# Patient Record
Sex: MALE | Race: WHITE | Employment: FULL TIME | URBAN - METROPOLITAN AREA
[De-identification: names, ages, dates, MRNs, and addresses within clinical notes are randomized per-mention and may not be internally consistent; named-entity substitution may affect disease eponyms.]

---

## 2018-12-08 LAB
CHOLESTEROL, TOTAL: 197 MG/DL
CHOLESTEROL/HDL RATIO: 6.4
HDLC SERPL-MCNC: 31 MG/DL (ref 35–70)
LDL CHOLESTEROL CALCULATED: 126 MG/DL (ref 0–160)
TRIGL SERPL-MCNC: 245 MG/DL
VLDLC SERPL CALC-MCNC: ABNORMAL MG/DL

## 2018-12-19 ENCOUNTER — TELEPHONE (OUTPATIENT)
Dept: SURGERY | Age: 43
End: 2018-12-19

## 2019-01-17 ENCOUNTER — OFFICE VISIT (OUTPATIENT)
Dept: SURGERY | Age: 44
End: 2019-01-17
Payer: COMMERCIAL

## 2019-01-17 VITALS
DIASTOLIC BLOOD PRESSURE: 78 MMHG | SYSTOLIC BLOOD PRESSURE: 130 MMHG | HEIGHT: 73 IN | HEART RATE: 89 BPM | WEIGHT: 217 LBS | OXYGEN SATURATION: 95 % | BODY MASS INDEX: 28.76 KG/M2

## 2019-01-17 DIAGNOSIS — K40.90 NON-RECURRENT INGUINAL HERNIA WITHOUT OBSTRUCTION OR GANGRENE, UNSPECIFIED LATERALITY: Primary | ICD-10-CM

## 2019-01-17 PROCEDURE — 99203 OFFICE O/P NEW LOW 30 MIN: CPT | Performed by: SURGERY

## 2019-01-17 RX ORDER — ESCITALOPRAM OXALATE 5 MG/1
TABLET ORAL
Refills: 2 | COMMUNITY
Start: 2018-12-31 | End: 2019-05-24 | Stop reason: SDUPTHER

## 2019-03-25 VITALS
TEMPERATURE: 97.8 F | DIASTOLIC BLOOD PRESSURE: 78 MMHG | HEIGHT: 73 IN | BODY MASS INDEX: 29.42 KG/M2 | SYSTOLIC BLOOD PRESSURE: 128 MMHG | WEIGHT: 222 LBS

## 2019-04-08 ENCOUNTER — TELEPHONE (OUTPATIENT)
Dept: SURGERY | Age: 44
End: 2019-04-08

## 2019-04-08 NOTE — TELEPHONE ENCOUNTER
JEFFERSON Garcia contacted patient regarding scheduling right possible left non reccurrent inguinal hernia repair with Dr Yasmany William. Patient didn't answer so MA left message asking patient to please call office back at [phone].     Electronically signed by Kinza Shaw on 4/8/19 at 9:29 AM

## 2019-04-29 ENCOUNTER — TELEPHONE (OUTPATIENT)
Dept: SURGERY | Age: 44
End: 2019-04-29

## 2019-04-29 NOTE — TELEPHONE ENCOUNTER
JEFFERSON Rosado contacted patient to see if he was ready to schedule hernia repair with Dr Juan Mason. Patient states he has a lot going on right now and will have to call office when he is ready to schedule.     Electronically signed by Neyda De Los Santos on 4/29/19 at 11:35 AM

## 2019-05-24 ENCOUNTER — OFFICE VISIT (OUTPATIENT)
Dept: PRIMARY CARE CLINIC | Age: 44
End: 2019-05-24
Payer: COMMERCIAL

## 2019-05-24 VITALS
BODY MASS INDEX: 28.17 KG/M2 | DIASTOLIC BLOOD PRESSURE: 78 MMHG | WEIGHT: 208 LBS | TEMPERATURE: 98.7 F | SYSTOLIC BLOOD PRESSURE: 126 MMHG | HEIGHT: 72 IN

## 2019-05-24 DIAGNOSIS — K40.90 RIGHT INGUINAL HERNIA: ICD-10-CM

## 2019-05-24 DIAGNOSIS — F41.9 ANXIETY: Primary | ICD-10-CM

## 2019-05-24 DIAGNOSIS — F34.1 DYSTHYMIA: ICD-10-CM

## 2019-05-24 DIAGNOSIS — E78.2 MIXED HYPERLIPIDEMIA: ICD-10-CM

## 2019-05-24 PROCEDURE — 99214 OFFICE O/P EST MOD 30 MIN: CPT | Performed by: FAMILY MEDICINE

## 2019-05-24 RX ORDER — ESCITALOPRAM OXALATE 10 MG/1
10 TABLET ORAL DAILY
Qty: 90 TABLET | Refills: 5 | Status: SHIPPED | OUTPATIENT
Start: 2019-05-24 | End: 2021-01-20

## 2019-05-24 RX ORDER — TRAZODONE HYDROCHLORIDE 50 MG/1
50 TABLET ORAL NIGHTLY PRN
Qty: 30 TABLET | Refills: 1 | Status: SHIPPED | OUTPATIENT
Start: 2019-05-24 | End: 2021-01-20

## 2019-05-24 ASSESSMENT — ENCOUNTER SYMPTOMS
ALLERGIC/IMMUNOLOGIC NEGATIVE: 1
RESPIRATORY NEGATIVE: 1
EYES NEGATIVE: 1
GASTROINTESTINAL NEGATIVE: 1

## 2019-09-09 ENCOUNTER — OFFICE VISIT (OUTPATIENT)
Dept: PRIMARY CARE CLINIC | Age: 44
End: 2019-09-09
Payer: COMMERCIAL

## 2019-09-09 VITALS
DIASTOLIC BLOOD PRESSURE: 68 MMHG | SYSTOLIC BLOOD PRESSURE: 126 MMHG | WEIGHT: 210 LBS | BODY MASS INDEX: 28.44 KG/M2 | TEMPERATURE: 97.9 F | HEIGHT: 72 IN

## 2019-09-09 DIAGNOSIS — F41.9 ANXIETY: ICD-10-CM

## 2019-09-09 DIAGNOSIS — L72.3 SEBACEOUS CYST: Primary | ICD-10-CM

## 2019-09-09 PROCEDURE — 99213 OFFICE O/P EST LOW 20 MIN: CPT | Performed by: FAMILY MEDICINE

## 2019-09-09 RX ORDER — CEPHALEXIN 500 MG/1
500 CAPSULE ORAL 3 TIMES DAILY
Qty: 21 CAPSULE | Refills: 1 | Status: SHIPPED
Start: 2019-09-09 | End: 2021-01-20

## 2019-09-09 ASSESSMENT — ENCOUNTER SYMPTOMS
RESPIRATORY NEGATIVE: 1
ROS SKIN COMMENTS: HPI

## 2019-09-25 ENCOUNTER — TELEPHONE (OUTPATIENT)
Dept: ADMINISTRATIVE | Age: 44
End: 2019-09-25

## 2020-02-06 ENCOUNTER — OFFICE VISIT (OUTPATIENT)
Dept: PRIMARY CARE CLINIC | Age: 45
End: 2020-02-06

## 2020-02-06 VITALS
OXYGEN SATURATION: 97 % | BODY MASS INDEX: 30.34 KG/M2 | RESPIRATION RATE: 16 BRPM | TEMPERATURE: 97.9 F | HEART RATE: 85 BPM | SYSTOLIC BLOOD PRESSURE: 128 MMHG | DIASTOLIC BLOOD PRESSURE: 82 MMHG | HEIGHT: 72 IN | WEIGHT: 224 LBS

## 2020-02-06 PROCEDURE — 99213 OFFICE O/P EST LOW 20 MIN: CPT | Performed by: FAMILY MEDICINE

## 2020-02-06 RX ORDER — PREDNISONE 10 MG/1
TABLET ORAL
Qty: 18 TABLET | Refills: 0 | Status: SHIPPED
Start: 2020-02-06 | End: 2021-01-20

## 2020-02-06 RX ORDER — DEXTROMETHORPHAN HYDROBROMIDE AND PROMETHAZINE HYDROCHLORIDE 15; 6.25 MG/5ML; MG/5ML
5 SYRUP ORAL 4 TIMES DAILY PRN
Qty: 120 ML | Refills: 0 | Status: SHIPPED | OUTPATIENT
Start: 2020-02-06 | End: 2020-02-13

## 2020-02-06 RX ORDER — CEFDINIR 300 MG/1
300 CAPSULE ORAL 2 TIMES DAILY
Qty: 20 CAPSULE | Refills: 0 | Status: SHIPPED | OUTPATIENT
Start: 2020-02-06 | End: 2020-02-16

## 2020-02-06 ASSESSMENT — ENCOUNTER SYMPTOMS
APNEA: 0
COUGH: 1
TROUBLE SWALLOWING: 0
SORE THROAT: 0
SINUS PAIN: 1
SHORTNESS OF BREATH: 0
STRIDOR: 0
SINUS PRESSURE: 1
WHEEZING: 0
CHEST TIGHTNESS: 0

## 2020-02-06 ASSESSMENT — PATIENT HEALTH QUESTIONNAIRE - PHQ9
1. LITTLE INTEREST OR PLEASURE IN DOING THINGS: 0
SUM OF ALL RESPONSES TO PHQ QUESTIONS 1-9: 0
2. FEELING DOWN, DEPRESSED OR HOPELESS: 0
SUM OF ALL RESPONSES TO PHQ9 QUESTIONS 1 & 2: 0
SUM OF ALL RESPONSES TO PHQ QUESTIONS 1-9: 0

## 2020-02-06 NOTE — PROGRESS NOTES
Never Used   Substance and Sexual Activity    Alcohol use: Not Currently    Drug use: Not Currently    Sexual activity: Not on file   Lifestyle    Physical activity:     Days per week: Not on file     Minutes per session: Not on file    Stress: Not on file   Relationships    Social connections:     Talks on phone: Not on file     Gets together: Not on file     Attends Mosque service: Not on file     Active member of club or organization: Not on file     Attends meetings of clubs or organizations: Not on file     Relationship status: Not on file    Intimate partner violence:     Fear of current or ex partner: Not on file     Emotionally abused: Not on file     Physically abused: Not on file     Forced sexual activity: Not on file   Other Topics Concern    Not on file   Social History Narrative        ANXIETY 12-18    , PENDING [address], [address] [place], 2018    HAS A GIRLFRIEND  engaged    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 Vitals:    02/06/20 1308   BP: 128/82   Pulse: 85   Resp: 16   Temp: 97.9 °F (36.6 °C)   SpO2: 97%   Weight: 224 lb (101.6 kg)   Height: 6' (1.829 m)       Objective:    Physical Exam  Constitutional:       Appearance: He is well-developed. HENT:      Head: Normocephalic. Eyes:      Pupils: Pupils are equal, round, and reactive to light. Neck:      Musculoskeletal: Neck supple.    Cardiovascular:      Rate and Rhythm: Normal rate and regular rhythm. Pulmonary:      Effort: Pulmonary effort is normal. No respiratory distress. Breath sounds: No wheezing or rales. Abdominal:      Palpations: Abdomen is soft. Elaine was seen today for chest congestion, nasal congestion and cough. Diagnoses and all orders for this visit:    Acute non-recurrent sinusitis of other sinus  -     cefdinir (OMNICEF) 300 MG capsule; Take 1 capsule by mouth 2 times daily for 10 days  -     predniSONE (DELTASONE) 10 MG tablet; ONE TID FOR THREE DAYS, ONE BID FOR THREE DAYS, ONE QD FOR THREE DAYS   FOOD  -     promethazine-dextromethorphan (PROMETHAZINE-DM) 6.25-15 MG/5ML syrup; Take 5 mLs by mouth 4 times daily as needed for Cough        Comments: med not  Great see  Worse toe r  A great deal of time spent reviewing medications, diet, exercise, social issues. Also reviewing the chart before entering the room with patient and finishing charting after leaving patient's room. More than half of that time was spent face to face with the patient in counseling and coordinating care. Dc smoking    Follow Up: Return if symptoms worsen or fail to improve.      Seen by:  Iesha Whitmore, DO

## 2020-06-08 ENCOUNTER — OFFICE VISIT (OUTPATIENT)
Dept: CHIROPRACTIC MEDICINE | Age: 45
End: 2020-06-08
Payer: COMMERCIAL

## 2020-06-08 ENCOUNTER — OFFICE VISIT (OUTPATIENT)
Dept: FAMILY MEDICINE CLINIC | Age: 45
End: 2020-06-08
Payer: COMMERCIAL

## 2020-06-08 VITALS — HEART RATE: 82 BPM | TEMPERATURE: 97.5 F | OXYGEN SATURATION: 93 % | BODY MASS INDEX: 28.75 KG/M2 | WEIGHT: 212 LBS

## 2020-06-08 VITALS — RESPIRATION RATE: 16 BRPM

## 2020-06-08 PROCEDURE — G8417 CALC BMI ABV UP PARAM F/U: HCPCS | Performed by: CHIROPRACTOR

## 2020-06-08 PROCEDURE — 99203 OFFICE O/P NEW LOW 30 MIN: CPT | Performed by: CHIROPRACTOR

## 2020-06-08 PROCEDURE — 4004F PT TOBACCO SCREEN RCVD TLK: CPT | Performed by: FAMILY MEDICINE

## 2020-06-08 PROCEDURE — 4004F PT TOBACCO SCREEN RCVD TLK: CPT | Performed by: CHIROPRACTOR

## 2020-06-08 PROCEDURE — G8417 CALC BMI ABV UP PARAM F/U: HCPCS | Performed by: FAMILY MEDICINE

## 2020-06-08 PROCEDURE — G8427 DOCREV CUR MEDS BY ELIG CLIN: HCPCS | Performed by: CHIROPRACTOR

## 2020-06-08 PROCEDURE — G8427 DOCREV CUR MEDS BY ELIG CLIN: HCPCS | Performed by: FAMILY MEDICINE

## 2020-06-08 PROCEDURE — 98940 CHIROPRACT MANJ 1-2 REGIONS: CPT | Performed by: CHIROPRACTOR

## 2020-06-08 PROCEDURE — 99213 OFFICE O/P EST LOW 20 MIN: CPT | Performed by: FAMILY MEDICINE

## 2020-06-08 RX ORDER — METHYLPREDNISOLONE 4 MG/1
TABLET ORAL
Qty: 1 KIT | Refills: 0 | Status: SHIPPED
Start: 2020-06-08 | End: 2021-01-20

## 2020-06-08 RX ORDER — IBUPROFEN 800 MG/1
800 TABLET ORAL EVERY 8 HOURS PRN
Qty: 60 TABLET | Refills: 1 | Status: SHIPPED
Start: 2020-06-08 | End: 2021-01-20

## 2020-06-08 RX ORDER — CYCLOBENZAPRINE HCL 5 MG
5 TABLET ORAL 3 TIMES DAILY PRN
Qty: 21 TABLET | Refills: 0 | Status: SHIPPED
Start: 2020-06-08 | End: 2021-01-20

## 2020-06-08 ASSESSMENT — ENCOUNTER SYMPTOMS
BACK PAIN: 1
RESPIRATORY NEGATIVE: 1
BACK PAIN: 1
GASTROINTESTINAL NEGATIVE: 1

## 2020-06-08 NOTE — PROGRESS NOTES
20  Melissa Vasquez : 1975 Sex: male  Age: 53 y.o. Chief Complaint   Patient presents with    Back Pain     lower back after digging a post hole yesterday        Patient 47-year-old white male with a chief complaint of acute onset of lower back pain after undergoing a postal yesterday he has no radiation or radiculopathy associated with it is mostly in the lumbosacral spine particularly on the right side but also on the left. Coughing and sneezing has no effect he does have somewhat of an attitude of flexion secondary to muscle spasm. Review of Systems   Constitutional: Negative. Respiratory: Negative. Cardiovascular: Negative. Gastrointestinal: Negative. Musculoskeletal: Positive for back pain, gait problem and myalgias. Current Outpatient Medications:     cyclobenzaprine (FLEXERIL) 5 MG tablet, Take 1 tablet by mouth 3 times daily as needed for Muscle spasms, Disp: 21 tablet, Rfl: 0    methylPREDNISolone (MEDROL DOSEPACK) 4 MG tablet, Take by mouth., Disp: 1 kit, Rfl: 0    ibuprofen (ADVIL;MOTRIN) 800 MG tablet, Take 1 tablet by mouth every 8 hours as needed for Pain, Disp: 60 tablet, Rfl: 1    predniSONE (DELTASONE) 10 MG tablet, ONE TID FOR THREE DAYS, ONE BID FOR THREE DAYS, ONE QD FOR THREE DAYS   FOOD, Disp: 18 tablet, Rfl: 0    cephALEXin (KEFLEX) 500 MG capsule, Take 1 capsule by mouth 3 times daily, Disp: 21 capsule, Rfl: 1    escitalopram (LEXAPRO) 10 MG tablet, Take 1 tablet by mouth daily, Disp: 90 tablet, Rfl: 5    traZODone (DESYREL) 50 MG tablet, Take 1 tablet by mouth nightly as needed for Sleep, Disp: 30 tablet, Rfl: 1  No Known Allergies    Past Medical History:   Diagnosis Date    Anxiety     Depression     Hypertriglyceridemia     Inguinal hernia     Bilateral    OCD (obsessive compulsive disorder)      No past surgical history on file.   Family History   Problem Relation Age of Onset    High Blood Pressure Father     Diabetes Father

## 2020-06-11 ENCOUNTER — OFFICE VISIT (OUTPATIENT)
Dept: CHIROPRACTIC MEDICINE | Age: 45
End: 2020-06-11
Payer: COMMERCIAL

## 2020-06-11 VITALS — RESPIRATION RATE: 16 BRPM

## 2020-06-11 PROCEDURE — 98940 CHIROPRACT MANJ 1-2 REGIONS: CPT | Performed by: CHIROPRACTOR

## 2021-01-20 ENCOUNTER — TELEPHONE (OUTPATIENT)
Dept: ADMINISTRATIVE | Age: 46
End: 2021-01-20

## 2021-01-20 ENCOUNTER — OFFICE VISIT (OUTPATIENT)
Dept: PRIMARY CARE CLINIC | Age: 46
End: 2021-01-20
Payer: COMMERCIAL

## 2021-01-20 VITALS
HEIGHT: 74 IN | RESPIRATION RATE: 18 BRPM | TEMPERATURE: 97.3 F | HEART RATE: 84 BPM | OXYGEN SATURATION: 97 % | BODY MASS INDEX: 28.23 KG/M2 | WEIGHT: 220 LBS

## 2021-01-20 DIAGNOSIS — Z20.822 ENCOUNTER FOR SCREENING LABORATORY TESTING FOR COVID-19 VIRUS: Primary | ICD-10-CM

## 2021-01-20 DIAGNOSIS — Z20.822 ENCOUNTER FOR SCREENING LABORATORY TESTING FOR COVID-19 VIRUS: ICD-10-CM

## 2021-01-20 DIAGNOSIS — J06.9 UPPER RESPIRATORY TRACT INFECTION, UNSPECIFIED TYPE: ICD-10-CM

## 2021-01-20 LAB
Lab: NORMAL
QC PASS/FAIL: NORMAL
SARS-COV-2, POC: NORMAL

## 2021-01-20 PROCEDURE — 87426 SARSCOV CORONAVIRUS AG IA: CPT | Performed by: FAMILY MEDICINE

## 2021-01-20 PROCEDURE — 99213 OFFICE O/P EST LOW 20 MIN: CPT | Performed by: FAMILY MEDICINE

## 2021-01-20 RX ORDER — AZITHROMYCIN 250 MG/1
250 TABLET, FILM COATED ORAL SEE ADMIN INSTRUCTIONS
Qty: 6 TABLET | Refills: 0 | Status: SHIPPED | OUTPATIENT
Start: 2021-01-20 | End: 2021-01-25

## 2021-01-20 ASSESSMENT — ENCOUNTER SYMPTOMS
ABDOMINAL PAIN: 0
CONSTIPATION: 0
SINUS PRESSURE: 0
RHINORRHEA: 0
VOMITING: 0
EYE PAIN: 0
SHORTNESS OF BREATH: 0
APNEA: 0
BACK PAIN: 0
CHEST TIGHTNESS: 0
BLOOD IN STOOL: 0
EYE REDNESS: 0
WHEEZING: 0
EYE ITCHING: 0
NAUSEA: 0
COLOR CHANGE: 0
DIARRHEA: 0
SWOLLEN GLANDS: 0
SORE THROAT: 0
COUGH: 0

## 2021-01-20 NOTE — PROGRESS NOTES
Chief Complaint:     Chief Complaint   Patient presents with    Generalized Body Aches     sx for last 3 days.  Fatigue    Headache    Chills    Nausea         Generalized Body Aches  This is a new problem. The current episode started in the past 7 days. The problem occurs daily. The problem has been unchanged. Associated symptoms include congestion, fatigue, headaches and myalgias. Pertinent negatives include no abdominal pain, arthralgias, chest pain, chills, coughing, fever, nausea, neck pain, numbness, rash, sore throat, swollen glands, vomiting or weakness. Nothing aggravates the symptoms. He has tried nothing for the symptoms. The treatment provided no relief. Fatigue  This is a new problem. The current episode started in the past 7 days. The problem occurs daily. The problem has been unchanged. Associated symptoms include congestion, fatigue, headaches and myalgias. Pertinent negatives include no abdominal pain, arthralgias, chest pain, chills, coughing, fever, nausea, neck pain, numbness, rash, sore throat, swollen glands, vomiting or weakness. Nothing aggravates the symptoms. He has tried nothing for the symptoms. The treatment provided no relief. Patient Active Problem List   Diagnosis    Anxiety    Dysthymia    Mixed hyperlipidemia    Right inguinal hernia       Past Medical History:   Diagnosis Date    Anxiety     Depression     Hypertriglyceridemia     Inguinal hernia     Bilateral    OCD (obsessive compulsive disorder)        No past surgical history on file. Current Outpatient Medications   Medication Sig Dispense Refill    azithromycin (ZITHROMAX) 250 MG tablet Take 1 tablet by mouth See Admin Instructions for 5 days 500mg on day 1 followed by 250mg on days 2 - 5 6 tablet 0     No current facility-administered medications for this visit.         No Known Allergies    Social History     Socioeconomic History    Marital status: Single     Spouse name: None  Number of children: None    Years of education: None    Highest education level: None   Occupational History    None   Social Needs    Financial resource strain: None    Food insecurity     Worry: None     Inability: None    Transportation needs     Medical: None     Non-medical: None   Tobacco Use    Smoking status: Current Every Day Smoker     Packs/day: 0.50    Smokeless tobacco: Never Used   Substance and Sexual Activity    Alcohol use: Not Currently    Drug use: Not Currently    Sexual activity: None   Lifestyle    Physical activity     Days per week: None     Minutes per session: None    Stress: None   Relationships    Social connections     Talks on phone: None     Gets together: None     Attends Caodaism service: None     Active member of club or organization: None     Attends meetings of clubs or organizations: None     Relationship status: None    Intimate partner violence     Fear of current or ex partner: None     Emotionally abused: None     Physically abused: None     Forced sexual activity: None   Other Topics Concern    None   Social History Narrative        ANXIETY 12-18    , PENDING DIVORCE    THREE CHILDREN, 11, 13 AND 14    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 Negative for activity change, appetite change, chills and fever. HENT: Positive for congestion. Negative for ear pain, hearing loss, nosebleeds, rhinorrhea, sinus pressure and sore throat. Eyes: Negative for pain, redness, itching and visual disturbance. Respiratory: Negative for apnea, cough, chest tightness, shortness of breath and wheezing. Cardiovascular: Negative for chest pain, palpitations and leg swelling. Gastrointestinal: Negative for abdominal pain, blood in stool, constipation, diarrhea, nausea and vomiting. Endocrine: Negative. Genitourinary: Negative for decreased urine volume, difficulty urinating, dysuria, frequency, hematuria and urgency. Musculoskeletal: Positive for myalgias. Negative for arthralgias, back pain, gait problem and neck pain. Skin: Negative for color change and rash. Allergic/Immunologic: Negative for environmental allergies and food allergies. Neurological: Positive for headaches. Negative for dizziness, weakness, light-headedness and numbness. Hematological: Negative for adenopathy. Does not bruise/bleed easily. Psychiatric/Behavioral: Negative for behavioral problems, dysphoric mood and sleep disturbance. The patient is not nervous/anxious and is not hyperactive. All other systems reviewed and are negative. Pulse 84   Temp 97.3 °F (36.3 °C)   Resp 18   Ht 6' 2\" (1.88 m)   Wt 220 lb (99.8 kg)   SpO2 97%   BMI 28.25 kg/m²     Physical Exam  Vitals signs and nursing note reviewed. Constitutional:       Appearance: He is well-developed. He is not toxic-appearing. HENT:      Head: Normocephalic and atraumatic. Right Ear: Ear canal normal. A middle ear effusion is present. Tympanic membrane is bulging. Left Ear: Ear canal normal. A middle ear effusion is present. Tympanic membrane is bulging. Nose: Mucosal edema and congestion present. No nasal deformity, septal deviation or rhinorrhea. Right Turbinates: Enlarged and swollen. Left Turbinates: Enlarged and swollen. Right Sinus: Maxillary sinus tenderness present. Left Sinus: Maxillary sinus tenderness present. Mouth/Throat:      Mouth: Mucous membranes are not pale and not dry. -     azithromycin (ZITHROMAX) 250 MG tablet; Take 1 tablet by mouth See Admin Instructions for 5 days 500mg on day 1 followed by 250mg on days 2 - 5          Return if symptoms worsen or fail to improve. I spent 15 minutes with this patient. I spent greater than 50% of the time counseling this patient.         John Paul Wu, DO  1/20/2021  11:23 AM

## 2021-01-20 NOTE — TELEPHONE ENCOUNTER
Anyone takes his insurance probably, I [facility].   He probably does not need a referral but if he finds out he does to let us know

## 2021-01-20 NOTE — TELEPHONE ENCOUNTER
Pt called and was wondering who pcp would advise him to go to for counseling. He recently went through a divorce and his kids are coming to live with him. Please contact pt. Also, pt said he is having covid symptoms and is going to [place] [facility].

## 2021-01-22 LAB
SARS-COV-2: NOT DETECTED
SOURCE: NORMAL

## 2021-02-17 ENCOUNTER — OFFICE VISIT (OUTPATIENT)
Dept: FAMILY MEDICINE CLINIC | Age: 46
End: 2021-02-17
Payer: COMMERCIAL

## 2021-02-17 VITALS
SYSTOLIC BLOOD PRESSURE: 112 MMHG | HEART RATE: 74 BPM | OXYGEN SATURATION: 99 % | HEIGHT: 74 IN | BODY MASS INDEX: 27.59 KG/M2 | WEIGHT: 215 LBS | TEMPERATURE: 97.4 F | DIASTOLIC BLOOD PRESSURE: 68 MMHG

## 2021-02-17 DIAGNOSIS — Z20.822 EXPOSURE TO COVID-19 VIRUS: Primary | ICD-10-CM

## 2021-02-17 LAB
Lab: NORMAL
QC PASS/FAIL: NORMAL
SARS-COV-2, POC: NORMAL

## 2021-02-17 PROCEDURE — 87426 SARSCOV CORONAVIRUS AG IA: CPT | Performed by: FAMILY MEDICINE

## 2021-02-17 PROCEDURE — 99213 OFFICE O/P EST LOW 20 MIN: CPT | Performed by: FAMILY MEDICINE

## 2021-02-17 NOTE — PROGRESS NOTES
Chief Complaint   No chief complaint on file. History of Present Illness   Source of history provided by: patient. Nasim Bocanegra is a 50 y.o. old male who has a past medical history of:   Past Medical History:   Diagnosis Date    Anxiety     Depression     Hypertriglyceridemia     Inguinal hernia     Bilateral    OCD (obsessive compulsive disorder)     Presents to the flu clinic for evaluation of Covid after recent exposure. Has been asymptomatic. No history of international travel in the past 14 days. Positive contact with individuals with known COVID-19 infection or under investigation for COVID-19 infection. Patient brother-in-law tested positive and they were exposed roughly 3 to 4 days ago. Denies any hx of asthma or COPD. Positive hx of tobacco use. ROS   Pertinent positives and negatives are stated within HPI, all other systems reviewed and are negative. Surgical History:  has no past surgical history on file. Social History:  reports that he has been smoking. He has been smoking about 0.50 packs per day. He has never used smokeless tobacco. He reports previous alcohol use. He reports previous drug use. Family History: family history includes Diabetes in his father; High Blood Pressure in his father. Allergies: Patient has no known allergies. Physical Exam      VS:  /68   Pulse 74   Temp 97.4 °F (36.3 °C) (Temporal)   Ht 6' 2\" (1.88 m)   Wt 215 lb (97.5 kg)   SpO2 99%   BMI 27.60 kg/m²    Oxygen Saturation Interpretation: Normal.    Constitutional:  Alert, development consistent with age. NAD. Head:  NC/NT. Airway patent. Nose: turbinates with mild erythema, no lesions. Mouth: Posterior pharynx with mild erythema and clear postnasal drip. No tonsillar hypertrophy or exudate. Neck:  Normal ROM. Supple. No anterior cervical adenopathy noted. Lungs: CTAB without wheezes, rales, or rhonchi. CV:  Regular rate and rhythm, normal heart sounds, without pathological murmurs, ectopy, gallops, or rubs. Abdomen: soft, nontender, NABS x 4, no firm or pulsatile masses, no organomegaly, no rebound or guarding. Skin:  Normal turgor. Warm, dry, without visible rash. Lymphatic: No lymphangitis or adenopathy noted unless otherwise specified. Neurological:  Oriented. Motor functions intact. Lab / Imaging Results   (All laboratory and radiology results have been personally reviewed by myself)  Labs:  Results for orders placed or performed in visit on 02/17/21   POCT COVID-19, Antigen   Result Value Ref Range    SARS-COV-2, POC Not-Detected Not Detected    Lot Number [id number]     QC Pass/Fail pass        Imaging: All Radiology results interpreted by Radiologist unless otherwise noted. No results found. Medical Decision Making   Pt non-toxic, in no apparent distress and stable at time of discharge.      Assessment/Plan   Diagnoses and all orders for this visit:    Exposure to COVID-19 virus  -     COVID-19 Ambulatory  -     POCT COVID-19, Antigen COVID-19 swab obtained and pending, will call with results once available. Advised cautionary self-quarantine at home in the interim. Pt should remain out of work for at least 10-14 days from the start of symptoms. Pt should also be fever free for 24 hours and symptoms should be improved overall prior to returning to work. Work excuse provided to patient today. Scripts written for no medication, side effects discussed. Increase fluids and rest. Symptomatic relief discussed including Tylenol prn pain/fever. Schedule virtual f/u with PCP in 7-10 days if symptoms persist. ED sooner if symptoms worsen or change. ED immediately with high or refractory fever, progressive SOB, dyspnea, CP, calf pain/swelling, shaking chills, vomiting, abdominal pain, lethargy, flank pain, or decreased urinary output. Pt verbalizes understanding and is in agreement with plan of care. All questions answered. Mateo Ya, DO    This visit was provided as a focused evaluation during the COVID -19 pandemic/national emergency. A comprehensive review of all previous patient history and testing was not conducted. Pertinent findings were elicited during the visit.

## 2021-02-18 ENCOUNTER — VIRTUAL VISIT (OUTPATIENT)
Dept: PRIMARY CARE CLINIC | Age: 46
End: 2021-02-18
Payer: COMMERCIAL

## 2021-02-18 DIAGNOSIS — F41.9 ANXIETY: Primary | ICD-10-CM

## 2021-02-18 PROCEDURE — 99213 OFFICE O/P EST LOW 20 MIN: CPT | Performed by: FAMILY MEDICINE

## 2021-02-18 RX ORDER — ESCITALOPRAM OXALATE 10 MG/1
10 TABLET ORAL DAILY
Qty: 30 TABLET | Refills: 1 | Status: SHIPPED
Start: 2021-02-18 | End: 2021-02-27 | Stop reason: SDUPTHER

## 2021-02-18 ASSESSMENT — PATIENT HEALTH QUESTIONNAIRE - PHQ9
SUM OF ALL RESPONSES TO PHQ9 QUESTIONS 1 & 2: 0
1. LITTLE INTEREST OR PLEASURE IN DOING THINGS: 0
SUM OF ALL RESPONSES TO PHQ QUESTIONS 1-9: 0
SUM OF ALL RESPONSES TO PHQ QUESTIONS 1-9: 0

## 2021-02-18 NOTE — PROGRESS NOTES
Kady Fung is a 50 y.o. male evaluated via telephone on 2/18/2021. Consent:  He and/or health care decision maker is aware that that he may receive a bill for this telephone service, depending on his insurance coverage, and has provided verbal consent to proceed: Yes      Documentation:  I communicated with the patient and/or health care decision maker about cougg. Details of this discussion including any medical advice provided:    disucs covid  Bro in law coivd  And    Yet he  Ck  And  Rapid neg and sent out and penidng  Ask for refill meds  He say out  of lexapro for three months     nees refill    Wait to see what his Covid test comes out and let us know. Given 1 month supply of Lexapro see me in a month for physical            I affirm this is a Patient Initiated Episode with a Patient who has not had a related appointment within my department in the past 7 days or scheduled within the next 24 hours.     Patient identification was verified at the start of the visit: Yes    Total Time: minutes: 5-10 minutes    Note: not billable if this call serves to triage the patient into an appointment for the relevant concern      Arley Garcia

## 2021-02-19 LAB
SARS-COV-2: NOT DETECTED
SOURCE: NORMAL

## 2021-02-27 ENCOUNTER — OFFICE VISIT (OUTPATIENT)
Dept: PRIMARY CARE CLINIC | Age: 46
End: 2021-02-27
Payer: COMMERCIAL

## 2021-02-27 DIAGNOSIS — F41.9 ANXIETY: ICD-10-CM

## 2021-02-27 DIAGNOSIS — H61.22 IMPACTED CERUMEN, LEFT EAR: ICD-10-CM

## 2021-02-27 DIAGNOSIS — E78.2 MIXED HYPERLIPIDEMIA: ICD-10-CM

## 2021-02-27 DIAGNOSIS — D22.9 ATYPICAL MOLE: Primary | ICD-10-CM

## 2021-02-27 DIAGNOSIS — Z00.01 ENCOUNTER FOR ANNUAL GENERAL MEDICAL EXAMINATION WITH ABNORMAL FINDINGS IN ADULT: ICD-10-CM

## 2021-02-27 DIAGNOSIS — Z12.5 PROSTATE CANCER SCREENING: ICD-10-CM

## 2021-02-27 PROCEDURE — 99396 PREV VISIT EST AGE 40-64: CPT | Performed by: FAMILY MEDICINE

## 2021-02-27 RX ORDER — ESCITALOPRAM OXALATE 10 MG/1
10 TABLET ORAL DAILY
Qty: 90 TABLET | Refills: 5 | Status: SHIPPED | OUTPATIENT
Start: 2021-02-27

## 2021-02-27 ASSESSMENT — ENCOUNTER SYMPTOMS
EYES NEGATIVE: 1
RESPIRATORY NEGATIVE: 1
ALLERGIC/IMMUNOLOGIC NEGATIVE: 1
GASTROINTESTINAL NEGATIVE: 1

## 2021-02-27 NOTE — PROGRESS NOTES
21  Name: Jan Santiago    : 1975    Sex: male    Age: 50 y.o. Subjective:  Chief Complaint: Patient is here for  Ck up and  Ask for me  to [address]     Wants to quit smoking    Stress  With wife    Ex  Wife issues  Kids want to move with him  Saw consleor past but  She is out office  sin enov  Wt up 11 lbs in 18 months  Work  [employer]      Review of Systems   Constitutional: Negative. HENT: Negative. Eyes: Negative. Respiratory: Negative. Cardiovascular: Negative. Gastrointestinal: Negative. Endocrine: Negative. Genitourinary: Negative. Musculoskeletal: Negative. Skin: Negative. Allergic/Immunologic: Negative. Neurological: Negative. Hematological: Negative. Psychiatric/Behavioral: The patient is nervous/anxious.           Current Outpatient Medications:     escitalopram (LEXAPRO) 10 MG tablet, Take 1 tablet by mouth daily, Disp: 90 tablet, Rfl: 5  No Known Allergies  Social History     Socioeconomic History    Marital status: Single     Spouse name: Not on file    Number of children: Not on file    Years of education: Not on file    Highest education level: Not on file   Occupational History    Not on file   Social Needs    Financial resource strain: Not on file    Food insecurity     Worry: Not on file     Inability: Not on file    Transportation needs     Medical: Not on file     Non-medical: Not on file   Tobacco Use    Smoking status: Current Every Day Smoker     Packs/day: 0.50    Smokeless tobacco: Never Used   Substance and Sexual Activity    Alcohol use: Not Currently    Drug use: Not Currently    Sexual activity: Not on file   Lifestyle    Physical activity     Days per week: Not on file     Minutes per session: Not on file    Stress: Not on file   Relationships    Social connections     Talks on phone: Not on file     Gets together: Not on file     Attends [employer] service: Not on file Active member of club or organization: Not on file     Attends meetings of clubs or organizations: Not on file     Relationship status: Not on file    Intimate partner violence     Fear of current or ex partner: Not on file     Emotionally abused: Not on file     Physically abused: Not on file     Forced sexual activity: Not on file   Other Topics Concern    Not on file   Social History Narrative        ANXIETY 12-18    , PENDING DIVORCEdone  11-20    [address], 4, 11 AND 12    [place] [facility] [place] [facility], [place]    [address] [facility] A GIRLFRIEND  engaged---5 yrs older--had one sone but murdered    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 Vitals:    02/27/21 0903   BP: 128/78   Resp: 16   Temp: 98 °F (36.7 °C)   TempSrc: Tympanic   Weight: 221 lb (100.2 kg)   Height: 6' 2\" (1.88 m)       Objective:    Physical Exam  Vitals signs reviewed. Constitutional:       Appearance: He is well-developed. HENT:      Head: Normocephalic. Ears:      Comments: Left earwax hard  Eyes:      Pupils: Pupils are equal, round, and reactive to light. Neck:      Musculoskeletal: Normal range of motion. Cardiovascular:      Rate and Rhythm: Normal rate and regular rhythm. Pulmonary:      Effort: Pulmonary effort is normal.      Breath sounds: Normal breath sounds. Abdominal:      Palpations: Abdomen is soft. Musculoskeletal: Normal range of motion.

## 2021-03-01 VITALS
HEIGHT: 74 IN | RESPIRATION RATE: 16 BRPM | WEIGHT: 221 LBS | DIASTOLIC BLOOD PRESSURE: 78 MMHG | BODY MASS INDEX: 28.36 KG/M2 | TEMPERATURE: 98 F | SYSTOLIC BLOOD PRESSURE: 128 MMHG

## 2021-03-01 PROBLEM — H61.22 IMPACTED CERUMEN, LEFT EAR: Status: ACTIVE | Noted: 2021-03-01

## 2021-03-02 DIAGNOSIS — Z00.01 ENCOUNTER FOR ANNUAL GENERAL MEDICAL EXAMINATION WITH ABNORMAL FINDINGS IN ADULT: ICD-10-CM

## 2021-03-02 DIAGNOSIS — Z12.5 PROSTATE CANCER SCREENING: ICD-10-CM

## 2021-03-02 LAB
ALBUMIN SERPL-MCNC: 4.3 G/DL (ref 3.5–5.2)
ALP BLD-CCNC: 68 U/L (ref 40–129)
ALT SERPL-CCNC: 20 U/L (ref 0–40)
ANION GAP SERPL CALCULATED.3IONS-SCNC: 13 MMOL/L (ref 7–16)
AST SERPL-CCNC: 19 U/L (ref 0–39)
BASOPHILS ABSOLUTE: 0.1 E9/L (ref 0–0.2)
BASOPHILS RELATIVE PERCENT: 1.1 % (ref 0–2)
BILIRUB SERPL-MCNC: 0.3 MG/DL (ref 0–1.2)
BILIRUBIN URINE: NEGATIVE
BLOOD, URINE: NEGATIVE
BUN BLDV-MCNC: 13 MG/DL (ref 6–20)
CALCIUM SERPL-MCNC: 9.4 MG/DL (ref 8.6–10.2)
CHLORIDE BLD-SCNC: 108 MMOL/L (ref 98–107)
CHOLESTEROL, TOTAL: 165 MG/DL (ref 0–199)
CLARITY: CLEAR
CO2: 22 MMOL/L (ref 22–29)
COLOR: YELLOW
CREAT SERPL-MCNC: 1.2 MG/DL (ref 0.7–1.2)
EOSINOPHILS ABSOLUTE: 0.78 E9/L (ref 0.05–0.5)
EOSINOPHILS RELATIVE PERCENT: 8.8 % (ref 0–6)
GFR AFRICAN AMERICAN: >60
GFR NON-AFRICAN AMERICAN: >60 ML/MIN/1.73
GLUCOSE BLD-MCNC: 98 MG/DL (ref 74–99)
GLUCOSE URINE: NEGATIVE MG/DL
HCT VFR BLD CALC: 45.6 % (ref 37–54)
HDLC SERPL-MCNC: 46 MG/DL
HEMOGLOBIN: 15.5 G/DL (ref 12.5–16.5)
IMMATURE GRANULOCYTES #: 0.02 E9/L
IMMATURE GRANULOCYTES %: 0.2 % (ref 0–5)
KETONES, URINE: NEGATIVE MG/DL
LDL CHOLESTEROL CALCULATED: 86 MG/DL (ref 0–99)
LEUKOCYTE ESTERASE, URINE: NEGATIVE
LYMPHOCYTES ABSOLUTE: 3.34 E9/L (ref 1.5–4)
LYMPHOCYTES RELATIVE PERCENT: 37.6 % (ref 20–42)
MCH RBC QN AUTO: 32.3 PG (ref 26–35)
MCHC RBC AUTO-ENTMCNC: 34 % (ref 32–34.5)
MCV RBC AUTO: 95 FL (ref 80–99.9)
MONOCYTES ABSOLUTE: 0.52 E9/L (ref 0.1–0.95)
MONOCYTES RELATIVE PERCENT: 5.8 % (ref 2–12)
NEUTROPHILS ABSOLUTE: 4.13 E9/L (ref 1.8–7.3)
NEUTROPHILS RELATIVE PERCENT: 46.5 % (ref 43–80)
NITRITE, URINE: NEGATIVE
PDW BLD-RTO: 12.7 FL (ref 11.5–15)
PH UA: 6 (ref 5–9)
PLATELET # BLD: 282 E9/L (ref 130–450)
PMV BLD AUTO: 10 FL (ref 7–12)
POTASSIUM SERPL-SCNC: 4.5 MMOL/L (ref 3.5–5)
PROSTATE SPECIFIC ANTIGEN: 0.88 NG/ML (ref 0–4)
PROTEIN UA: NEGATIVE MG/DL
RBC # BLD: 4.8 E12/L (ref 3.8–5.8)
SODIUM BLD-SCNC: 143 MMOL/L (ref 132–146)
SPECIFIC GRAVITY UA: 1.02 (ref 1–1.03)
TOTAL PROTEIN: 6.7 G/DL (ref 6.4–8.3)
TRIGL SERPL-MCNC: 166 MG/DL (ref 0–149)
UROBILINOGEN, URINE: 0.2 E.U./DL
VLDLC SERPL CALC-MCNC: 33 MG/DL
WBC # BLD: 8.9 E9/L (ref 4.5–11.5)

## 2021-03-06 ENCOUNTER — OFFICE VISIT (OUTPATIENT)
Dept: PRIMARY CARE CLINIC | Age: 46
End: 2021-03-06
Payer: COMMERCIAL

## 2021-03-06 VITALS
TEMPERATURE: 98.8 F | HEIGHT: 74 IN | WEIGHT: 222 LBS | DIASTOLIC BLOOD PRESSURE: 68 MMHG | SYSTOLIC BLOOD PRESSURE: 126 MMHG | BODY MASS INDEX: 28.49 KG/M2

## 2021-03-06 DIAGNOSIS — H61.22 IMPACTED CERUMEN, LEFT EAR: ICD-10-CM

## 2021-03-06 DIAGNOSIS — F41.9 ANXIETY: ICD-10-CM

## 2021-03-06 DIAGNOSIS — F34.1 DYSTHYMIA: ICD-10-CM

## 2021-03-06 DIAGNOSIS — Z00.01 ENCOUNTER FOR ANNUAL GENERAL MEDICAL EXAMINATION WITH ABNORMAL FINDINGS IN ADULT: Primary | ICD-10-CM

## 2021-03-06 PROCEDURE — 99214 OFFICE O/P EST MOD 30 MIN: CPT | Performed by: FAMILY MEDICINE

## 2021-03-06 ASSESSMENT — ENCOUNTER SYMPTOMS
EYES NEGATIVE: 1
ALLERGIC/IMMUNOLOGIC NEGATIVE: 1
RESPIRATORY NEGATIVE: 1
GASTROINTESTINAL NEGATIVE: 1

## 2021-03-27 ENCOUNTER — IMMUNIZATION (OUTPATIENT)
Dept: PRIMARY CARE CLINIC | Age: 46
End: 2021-03-27
Payer: COMMERCIAL

## 2021-03-27 PROCEDURE — 0001A COVID-19, PFIZER VACCINE 30MCG/0.3ML DOSE: CPT | Performed by: INTERNAL MEDICINE

## 2021-03-27 PROCEDURE — 91300 COVID-19, PFIZER VACCINE 30MCG/0.3ML DOSE: CPT | Performed by: INTERNAL MEDICINE

## 2021-04-27 ENCOUNTER — IMMUNIZATION (OUTPATIENT)
Dept: PRIMARY CARE CLINIC | Age: 46
End: 2021-04-27
Payer: COMMERCIAL

## 2021-04-27 PROCEDURE — 91300 COVID-19, PFIZER VACCINE 30MCG/0.3ML DOSE: CPT | Performed by: NURSE PRACTITIONER

## 2021-04-27 PROCEDURE — 0002A COVID-19, PFIZER VACCINE 30MCG/0.3ML DOSE: CPT | Performed by: NURSE PRACTITIONER

## 2022-01-03 ENCOUNTER — OFFICE VISIT (OUTPATIENT)
Dept: FAMILY MEDICINE CLINIC | Age: 47
End: 2022-01-03
Payer: COMMERCIAL

## 2022-01-03 VITALS
SYSTOLIC BLOOD PRESSURE: 128 MMHG | BODY MASS INDEX: 29.02 KG/M2 | WEIGHT: 226 LBS | DIASTOLIC BLOOD PRESSURE: 78 MMHG | HEART RATE: 100 BPM | TEMPERATURE: 97.9 F | OXYGEN SATURATION: 92 %

## 2022-01-03 DIAGNOSIS — Z20.822 EXPOSURE TO COVID-19 VIRUS: ICD-10-CM

## 2022-01-03 DIAGNOSIS — J01.80 ACUTE NON-RECURRENT SINUSITIS OF OTHER SINUS: ICD-10-CM

## 2022-01-03 DIAGNOSIS — Z20.822 EXPOSURE TO COVID-19 VIRUS: Primary | ICD-10-CM

## 2022-01-03 LAB
Lab: NORMAL
PERFORMING INSTRUMENT: NORMAL
QC PASS/FAIL: NORMAL
SARS-COV-2, POC: NORMAL

## 2022-01-03 PROCEDURE — 87426 SARSCOV CORONAVIRUS AG IA: CPT | Performed by: FAMILY MEDICINE

## 2022-01-03 PROCEDURE — 99213 OFFICE O/P EST LOW 20 MIN: CPT | Performed by: FAMILY MEDICINE

## 2022-01-03 RX ORDER — METHYLPREDNISOLONE 4 MG/1
TABLET ORAL
Qty: 1 KIT | Refills: 0 | Status: SHIPPED | OUTPATIENT
Start: 2022-01-03

## 2022-01-03 RX ORDER — AZITHROMYCIN 250 MG/1
250 TABLET, FILM COATED ORAL SEE ADMIN INSTRUCTIONS
Qty: 6 TABLET | Refills: 0 | Status: SHIPPED | OUTPATIENT
Start: 2022-01-03 | End: 2022-01-08

## 2022-01-03 ASSESSMENT — ENCOUNTER SYMPTOMS
ALLERGIC/IMMUNOLOGIC NEGATIVE: 1
GASTROINTESTINAL NEGATIVE: 1
EYES NEGATIVE: 1
RESPIRATORY NEGATIVE: 1

## 2022-01-03 ASSESSMENT — PATIENT HEALTH QUESTIONNAIRE - PHQ9
1. LITTLE INTEREST OR PLEASURE IN DOING THINGS: 0
SUM OF ALL RESPONSES TO PHQ QUESTIONS 1-9: 0
2. FEELING DOWN, DEPRESSED OR HOPELESS: 0
SUM OF ALL RESPONSES TO PHQ QUESTIONS 1-9: 0
SUM OF ALL RESPONSES TO PHQ9 QUESTIONS 1 & 2: 0
SUM OF ALL RESPONSES TO PHQ QUESTIONS 1-9: 0
SUM OF ALL RESPONSES TO PHQ QUESTIONS 1-9: 0

## 2022-01-03 NOTE — PROGRESS NOTES
1/3/22  Name: Sterling Wang    : 1975    Sex: male    Age: 48 y.o. Subjective:  Chief Complaint: Patient is here for cough  Nate  Sinus     chills     Here with aris  Also ill no tmep    But slgiht chills      Review of Systems   Constitutional: Positive for chills. Negative for diaphoresis, fatigue and fever. HENT: Negative. Eyes: Negative. Respiratory: Negative. Cardiovascular: Negative. Gastrointestinal: Negative. Endocrine: Negative. Genitourinary: Negative. Musculoskeletal: Negative. Skin: Negative. Allergic/Immunologic: Negative. Neurological: Negative. Hematological: Negative. Psychiatric/Behavioral: Negative.           Current Outpatient Medications:     azithromycin (ZITHROMAX) 250 MG tablet, Take 1 tablet by mouth See Admin Instructions for 5 days 500mg on day 1 followed by 250mg on days 2 - 5, Disp: 6 tablet, Rfl: 0    methylPREDNISolone (MEDROL DOSEPACK) 4 MG tablet, Take by mouth., Disp: 1 kit, Rfl: 0    escitalopram (LEXAPRO) 10 MG tablet, Take 1 tablet by mouth daily, Disp: 90 tablet, Rfl: 5  No Known Allergies  Social History     Socioeconomic History    Marital status: Single     Spouse name: Not on file    Number of children: Not on file    Years of education: Not on file    Highest education level: Not on file   Occupational History    Not on file   Tobacco Use    Smoking status: Current Every Day Smoker     Packs/day: 0.50    Smokeless tobacco: Never Used   Substance and Sexual Activity    Alcohol use: Not Currently    Drug use: Not Currently    Sexual activity: Not on file   Other Topics Concern    Not on file   Social History Narrative        ANXIETY 12-18    , PENDING DIVORCEdone      THREE CHILDREN, 11, 13 AND 14    MOVED FROM [facility] [place] [facility],     HAS A GIRLFRIEND  engaged---5 yrs older--had one sone but murdered    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       FAMILY HX OF DIABETES, HYPERTENSION IN FATHER    BILATERAL INGUINAL HERNIAS SINCE AGE 35 WITHOUT SURGERY    CIGARETTE SMOKER    HYPERTRIGLYCERIDEMIA 12-18    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   1-21     Social Determinants of Health     Financial Resource Strain:     Difficulty of Paying Living Expenses: Not on file   Food Insecurity:     Worried About Running Out of Food in the Last Year: Not on file    Kit of Food in the Last Year: Not on file   Transportation Needs:     Lack of Transportation (Medical): Not on file    Lack of Transportation (Non-Medical): Not on file   Physical Activity:     Days of Exercise per Week: Not on file    Minutes of Exercise per Session: Not on file   Stress:     Feeling of Stress : Not on file   Social Connections:     Frequency of Communication with Friends and Family: Not on file    Frequency of Social Gatherings with Friends and Family: Not on file    Attends Druze Services: Not on file    Active Member of [address] [place] [address] or Organizations: Not on file    Attends Club or Organization Meetings: Not on file    Marital Status: Not on file   Intimate Partner Violence:     Fear of Current or Ex-Partner: Not on file    Emotionally Abused: Not on file    Physically Abused: Not on file    Sexually Abused: Not on file   Housing Stability:     Unable to Pay for Housing in the Last Year: Not on file    Number of [place] in the Last Year: Not on file    Unstable Housing in the Last Year: Not on file      Past Medical History:   Diagnosis Date    Anxiety     Depression     Hypertriglyceridemia     Inguinal hernia     Bilateral    OCD (obsessive compulsive disorder)      Family History   Problem Relation Age of Onset    High Blood Pressure Father     Diabetes Father       No past surgical history on file.    Vitals:    01/03/22 1045   BP: 128/78   Pulse: 100   Temp: 97.9 °F (36.6 °C)   TempSrc: Infrared   SpO2: 92%   Weight: 226 lb (102.5 kg) Objective:    Physical Exam  Vitals reviewed. Constitutional:       Appearance: He is well-developed. HENT:      Head: Normocephalic. Eyes:      Pupils: Pupils are equal, round, and reactive to light. Cardiovascular:      Rate and Rhythm: Normal rate and regular rhythm. Pulmonary:      Effort: Pulmonary effort is normal.      Breath sounds: Normal breath sounds. Abdominal:      Palpations: Abdomen is soft. Musculoskeletal:         General: Normal range of motion. Cervical back: Normal range of motion. Skin:     General: Skin is warm. Neurological:      Mental Status: He is alert and oriented to person, place, and time. Psychiatric:         Behavior: Behavior normal.         Elaine was seen today for headache and congestion. Diagnoses and all orders for this visit:    Exposure to COVID-19 virus  -     POCT COVID-19, Antigen    Acute non-recurrent sinusitis of other sinus  -     azithromycin (ZITHROMAX) 250 MG tablet; Take 1 tablet by mouth See Admin Instructions for 5 days 500mg on day 1 followed by 250mg on days 2 - 5  -     methylPREDNISolone (MEDROL DOSEPACK) 4 MG tablet; Take by mouth. Comments: rapid covid      meds  Not  grea madalyn     Z-Robin prescribed. Over-the-counter zinc and vitamin C. Purchase an oximeter and call if oxygen saturation less than 90%. If any temperature over 102 degree, shortness of breath,  chest pain go to the emergency room. Complete isolation until results are back from the Covid testing. Do not work until results are back. A great deal of time spent reviewing medications, diet, exercise, social issues. Also reviewing the chart before entering the room with patient and finishing charting after leaving patient's room. More than half of that time was spent face to face with the patient in counseling and coordinating care. Follow Up: Return if symptoms worsen or fail to improve, for Meds. If worse go to ER. Not better in 24 hr see. Seen by:  Mary Hung, DO

## 2022-01-05 LAB
SARS-COV-2: NOT DETECTED
SOURCE: NORMAL

## 2022-01-06 ENCOUNTER — TELEPHONE (OUTPATIENT)
Dept: PRIMARY CARE CLINIC | Age: 47
End: 2022-01-06

## 2022-11-28 ENCOUNTER — OFFICE VISIT (OUTPATIENT)
Dept: FAMILY MEDICINE CLINIC | Age: 47
End: 2022-11-28
Payer: COMMERCIAL

## 2022-11-28 VITALS
OXYGEN SATURATION: 98 % | BODY MASS INDEX: 29.52 KG/M2 | TEMPERATURE: 97.5 F | DIASTOLIC BLOOD PRESSURE: 76 MMHG | HEIGHT: 74 IN | HEART RATE: 91 BPM | WEIGHT: 230 LBS | SYSTOLIC BLOOD PRESSURE: 128 MMHG

## 2022-11-28 DIAGNOSIS — J01.90 ACUTE SINUSITIS, RECURRENCE NOT SPECIFIED, UNSPECIFIED LOCATION: Primary | ICD-10-CM

## 2022-11-28 PROCEDURE — 99213 OFFICE O/P EST LOW 20 MIN: CPT

## 2022-11-28 RX ORDER — METHYLPREDNISOLONE 4 MG/1
TABLET ORAL
Qty: 1 KIT | Refills: 0 | Status: SHIPPED | OUTPATIENT
Start: 2022-11-28

## 2022-11-28 RX ORDER — AMOXICILLIN AND CLAVULANATE POTASSIUM 875; 125 MG/1; MG/1
1 TABLET, FILM COATED ORAL 2 TIMES DAILY
Qty: 20 TABLET | Refills: 0 | Status: SHIPPED | OUTPATIENT
Start: 2022-11-28 | End: 2022-12-08

## 2022-11-28 NOTE — PROGRESS NOTES
Chief Complaint       Sinus Problem (Started 3 days ago. Mucinex, Nightquil no relief) and Cough    History of Present Illness   Source of history provided by:  patient. Thomas Nunez is a 50 y.o. old male presenting to the walk in clinic for evaluation of sinus pressure, nasal congestion, nasal drainage, bilateral ear pressure, mild non-productive cough  x 3-4 days. Has been taking Mucinex, Nyquil OTC without relief. Denies any fever, chills, wheezing, CP, SOB, or GI symptoms. Denies any hx of asthma or COPD. Denies any contact with any individuals with known COVID-19 infection or under investigation for COVID-19 infection. Pt has been vaccinated for COVID-19. ROS    Unless otherwise stated in this report or unable to obtain because of the patient's clinical or mental status as evidenced by the medical record, this patients's positive and negative responses for Review of Systems, constitutional, psych, eyes, ENT, cardiovascular, respiratory, gastrointestinal, neurological, genitourinary, musculoskeletal, integument systems and systems related to the presenting problem are either stated in the preceding or were not pertinent or were negative for the symptoms and/or complaints related to the medical problem. Physical Exam         VS:  /76   Pulse 91   Temp 97.5 °F (36.4 °C)   Ht 6' 2\" (1.88 m)   Wt 230 lb (104.3 kg)   SpO2 98%   BMI 29.53 kg/m²    Oxygen Saturation Interpretation: Normal.    Constitutional:  Alert, development consistent with age. Head: Mild TTP over the maxillary sinuses. Ears:  External Ears: Bilateral pinna normal. TMs translucent without erythema or perforation bilaterally. Canals normal bilaterally without swelling or exudate  Nose:  Mild congestion of the nasal mucosa. There is np injection to middle turbinates bilaterally. Throat: Mild posterior pharyngeal erythema with mild post nasal drip present. No exudate or tonsillar hypertrophy noted. Neck:  Supple. There is no anterior cervical adenopathy. Lungs: CTAB without wheezes, rales, or rhonchi  Heart:  Regular rate and rhythm, normal heart sounds, without pathological murmurs, ectopy, gallops, or rubs. Skin:  Normal turgor. Warm, dry, without visible rash. Neurological:  Alert and oriented. Motor functions intact. Responds to verbal commands. Lab / Imaging Results   (All laboratory and radiology results have been personally reviewed by myself)  Labs:  No results found for this visit on 11/28/22. Imaging: All Radiology results interpreted by Radiologist unless otherwise noted. Assessment / Plan     Impression(s):  Elaine was seen today for sinus problem and cough. Diagnoses and all orders for this visit:    Acute sinusitis, recurrence not specified, unspecified location  -     amoxicillin-clavulanate (AUGMENTIN) 875-125 MG per tablet; Take 1 tablet by mouth 2 times daily for 10 days  -     methylPREDNISolone (MEDROL DOSEPACK) 4 MG tablet; Take by mouth. Disposition:  Disposition: Discharge to home. Script written for Augmentin and dose pack, side effects discussed. Increase fluids and rest. Symptomatic relief discussed. F/u PCP in 5-7 days if symptoms persist. ED sooner if symptoms worsen or change. Red flag symptoms discussed. Pt is in agreement with this care plan. All questions answered. ELBA Villalba    **This report was transcribed using voice recognition software. Every effort was made to ensure accuracy; however, inadvertent computerized transcription errors may be present.

## 2023-09-07 ENCOUNTER — TELEPHONE (OUTPATIENT)
Dept: PRIMARY CARE CLINIC | Age: 48
End: 2023-09-07

## 2023-09-07 DIAGNOSIS — Z00.01 ENCOUNTER FOR ANNUAL GENERAL MEDICAL EXAMINATION WITH ABNORMAL FINDINGS IN ADULT: Primary | ICD-10-CM

## 2023-09-07 NOTE — TELEPHONE ENCOUNTER
----- Message from Alberto Jones sent at 9/7/2023  2:13 PM EDT -----  Subject: Referral Request    Reason for referral request? Pt would like to have labs done before   physical on 9/18/2023   Provider patient wants to be referred to(if known):     Provider Phone Number(if known): Additional Information for Provider?  Pt would like to be contacted when he   can have labs done  ---------------------------------------------------------------------------  --------------  Jay Marine Galen    [phone]; OK to leave message on voicemail  ---------------------------------------------------------------------------  --------------

## 2023-09-10 ENCOUNTER — OFFICE VISIT (OUTPATIENT)
Dept: FAMILY MEDICINE CLINIC | Age: 48
End: 2023-09-10
Payer: COMMERCIAL

## 2023-09-10 VITALS
WEIGHT: 230 LBS | TEMPERATURE: 98.1 F | OXYGEN SATURATION: 97 % | BODY MASS INDEX: 29.52 KG/M2 | HEIGHT: 74 IN | DIASTOLIC BLOOD PRESSURE: 72 MMHG | SYSTOLIC BLOOD PRESSURE: 126 MMHG | HEART RATE: 87 BPM

## 2023-09-10 DIAGNOSIS — L03.011 PARONYCHIA OF FINGER OF RIGHT HAND: Primary | ICD-10-CM

## 2023-09-10 PROCEDURE — 99213 OFFICE O/P EST LOW 20 MIN: CPT | Performed by: STUDENT IN AN ORGANIZED HEALTH CARE EDUCATION/TRAINING PROGRAM

## 2023-09-10 RX ORDER — CEPHALEXIN 500 MG/1
500 CAPSULE ORAL 4 TIMES DAILY
Qty: 40 CAPSULE | Refills: 0 | Status: SHIPPED | OUTPATIENT
Start: 2023-09-10 | End: 2023-09-20

## 2023-09-10 SDOH — ECONOMIC STABILITY: HOUSING INSECURITY
IN THE LAST 12 MONTHS, WAS THERE A TIME WHEN YOU DID NOT HAVE A STEADY PLACE TO SLEEP OR SLEPT IN A SHELTER (INCLUDING NOW)?: NO

## 2023-09-10 SDOH — ECONOMIC STABILITY: FOOD INSECURITY: WITHIN THE PAST 12 MONTHS, THE FOOD YOU BOUGHT JUST DIDN'T LAST AND YOU DIDN'T HAVE MONEY TO GET MORE.: NEVER TRUE

## 2023-09-10 SDOH — ECONOMIC STABILITY: INCOME INSECURITY: HOW HARD IS IT FOR YOU TO PAY FOR THE VERY BASICS LIKE FOOD, HOUSING, MEDICAL CARE, AND HEATING?: NOT HARD AT ALL

## 2023-09-10 SDOH — ECONOMIC STABILITY: FOOD INSECURITY: WITHIN THE PAST 12 MONTHS, YOU WORRIED THAT YOUR FOOD WOULD RUN OUT BEFORE YOU GOT MONEY TO BUY MORE.: NEVER TRUE

## 2023-09-12 DIAGNOSIS — Z00.01 ENCOUNTER FOR ANNUAL GENERAL MEDICAL EXAMINATION WITH ABNORMAL FINDINGS IN ADULT: ICD-10-CM

## 2023-09-12 LAB
ABSOLUTE IMMATURE GRANULOCYTE: 0.03 K/UL (ref 0–0.58)
ALBUMIN SERPL-MCNC: 4.5 G/DL (ref 3.5–5.2)
ALP BLD-CCNC: 65 U/L (ref 40–129)
ALT SERPL-CCNC: 47 U/L (ref 0–40)
ANION GAP SERPL CALCULATED.3IONS-SCNC: 19 MMOL/L (ref 7–16)
AST SERPL-CCNC: 29 U/L (ref 0–39)
BASOPHILS ABSOLUTE: 0.08 K/UL (ref 0–0.2)
BASOPHILS RELATIVE PERCENT: 1 % (ref 0–2)
BILIRUB SERPL-MCNC: 0.2 MG/DL (ref 0–1.2)
BILIRUBIN URINE: NEGATIVE
BUN BLDV-MCNC: 10 MG/DL (ref 6–20)
CALCIUM SERPL-MCNC: 9.2 MG/DL (ref 8.6–10.2)
CHLORIDE BLD-SCNC: 104 MMOL/L (ref 98–107)
CHOLESTEROL: 184 MG/DL
CO2: 17 MMOL/L (ref 22–29)
COLOR: YELLOW
COMMENT: NORMAL
CREAT SERPL-MCNC: 1 MG/DL (ref 0.7–1.2)
EOSINOPHILS ABSOLUTE: 0.79 K/UL (ref 0.05–0.5)
EOSINOPHILS RELATIVE PERCENT: 12 % (ref 0–6)
GFR SERPL CREATININE-BSD FRML MDRD: >60 ML/MIN/1.73M2
GLUCOSE BLD-MCNC: 90 MG/DL (ref 74–99)
GLUCOSE URINE: NEGATIVE MG/DL
HCT VFR BLD CALC: 44.8 % (ref 37–54)
HDLC SERPL-MCNC: 44 MG/DL
HEMOGLOBIN: 15.4 G/DL (ref 12.5–16.5)
IMMATURE GRANULOCYTES: 1 % (ref 0–5)
KETONES, URINE: NEGATIVE MG/DL
LDL CHOLESTEROL: 100 MG/DL
LEUKOCYTE ESTERASE, URINE: NEGATIVE
LYMPHOCYTES ABSOLUTE: 2.85 K/UL (ref 1.5–4)
LYMPHOCYTES RELATIVE PERCENT: 44 % (ref 20–42)
MCH RBC QN AUTO: 32.2 PG (ref 26–35)
MCHC RBC AUTO-ENTMCNC: 34.4 G/DL (ref 32–34.5)
MCV RBC AUTO: 93.7 FL (ref 80–99.9)
MONOCYTES ABSOLUTE: 0.34 K/UL (ref 0.1–0.95)
MONOCYTES RELATIVE PERCENT: 5 % (ref 2–12)
NEUTROPHILS ABSOLUTE: 2.46 K/UL (ref 1.8–7.3)
NEUTROPHILS RELATIVE PERCENT: 38 % (ref 43–80)
NITRITE, URINE: NEGATIVE
PDW BLD-RTO: 12.4 % (ref 11.5–15)
PH UA: 6 (ref 5–9)
PLATELET # BLD: 265 K/UL (ref 130–450)
PMV BLD AUTO: 9.8 FL (ref 7–12)
POTASSIUM SERPL-SCNC: 4.7 MMOL/L (ref 3.5–5)
PROSTATE SPECIFIC ANTIGEN: 0.94 NG/ML (ref 0–4)
PROTEIN UA: NEGATIVE MG/DL
RBC # BLD: 4.78 M/UL (ref 3.8–5.8)
SODIUM BLD-SCNC: 140 MMOL/L (ref 132–146)
SPECIFIC GRAVITY UA: 1.02 (ref 1–1.03)
TOTAL PROTEIN: 7 G/DL (ref 6.4–8.3)
TRIGL SERPL-MCNC: 200 MG/DL
TURBIDITY: CLEAR
URINE HGB: NEGATIVE
UROBILINOGEN, URINE: 0.2 EU/DL (ref 0–1)
VLDLC SERPL CALC-MCNC: 40 MG/DL
WBC # BLD: 6.6 K/UL (ref 4.5–11.5)

## 2023-09-18 ENCOUNTER — OFFICE VISIT (OUTPATIENT)
Dept: PRIMARY CARE CLINIC | Age: 48
End: 2023-09-18
Payer: COMMERCIAL

## 2023-09-18 VITALS
WEIGHT: 228 LBS | DIASTOLIC BLOOD PRESSURE: 78 MMHG | SYSTOLIC BLOOD PRESSURE: 128 MMHG | TEMPERATURE: 97.9 F | BODY MASS INDEX: 29.26 KG/M2 | HEIGHT: 74 IN

## 2023-09-18 DIAGNOSIS — K40.90 RIGHT INGUINAL HERNIA: ICD-10-CM

## 2023-09-18 DIAGNOSIS — Z00.01 ENCOUNTER FOR ANNUAL GENERAL MEDICAL EXAMINATION WITH ABNORMAL FINDINGS IN ADULT: Primary | ICD-10-CM

## 2023-09-18 DIAGNOSIS — E78.2 MIXED HYPERLIPIDEMIA: ICD-10-CM

## 2023-09-18 DIAGNOSIS — D22.9 ATYPICAL MOLE: ICD-10-CM

## 2023-09-18 DIAGNOSIS — R07.89 OTHER CHEST PAIN: ICD-10-CM

## 2023-09-18 DIAGNOSIS — R79.89 ELEVATED LIVER FUNCTION TESTS: ICD-10-CM

## 2023-09-18 DIAGNOSIS — F17.210 CIGARETTE NICOTINE DEPENDENCE WITHOUT COMPLICATION: ICD-10-CM

## 2023-09-18 DIAGNOSIS — N52.8 OTHER MALE ERECTILE DYSFUNCTION: ICD-10-CM

## 2023-09-18 PROCEDURE — 99396 PREV VISIT EST AGE 40-64: CPT | Performed by: FAMILY MEDICINE

## 2023-09-18 PROCEDURE — 93000 ELECTROCARDIOGRAM COMPLETE: CPT | Performed by: FAMILY MEDICINE

## 2023-09-18 RX ORDER — VARENICLINE TARTRATE 1 MG/1
1 TABLET, FILM COATED ORAL 2 TIMES DAILY
Qty: 180 TABLET | Refills: 1 | Status: SHIPPED | OUTPATIENT
Start: 2023-09-18

## 2023-09-18 RX ORDER — VARENICLINE TARTRATE 0.5 MG/1
.5-1 TABLET, FILM COATED ORAL SEE ADMIN INSTRUCTIONS
Qty: 57 TABLET | Refills: 0 | Status: SHIPPED | OUTPATIENT
Start: 2023-09-18

## 2023-09-18 RX ORDER — SILDENAFIL CITRATE 20 MG/1
TABLET ORAL
Qty: 30 TABLET | Refills: 12 | Status: SHIPPED | OUTPATIENT
Start: 2023-09-18

## 2023-09-18 ASSESSMENT — PATIENT HEALTH QUESTIONNAIRE - PHQ9
1. LITTLE INTEREST OR PLEASURE IN DOING THINGS: 0
SUM OF ALL RESPONSES TO PHQ QUESTIONS 1-9: 0
SUM OF ALL RESPONSES TO PHQ QUESTIONS 1-9: 0
2. FEELING DOWN, DEPRESSED OR HOPELESS: 0
SUM OF ALL RESPONSES TO PHQ QUESTIONS 1-9: 0
SUM OF ALL RESPONSES TO PHQ9 QUESTIONS 1 & 2: 0
SUM OF ALL RESPONSES TO PHQ QUESTIONS 1-9: 0

## 2023-09-28 ENCOUNTER — OFFICE VISIT (OUTPATIENT)
Dept: SURGERY | Age: 48
End: 2023-09-28
Payer: COMMERCIAL

## 2023-09-28 VITALS
BODY MASS INDEX: 28.37 KG/M2 | DIASTOLIC BLOOD PRESSURE: 81 MMHG | WEIGHT: 221 LBS | OXYGEN SATURATION: 98 % | TEMPERATURE: 97.7 F | HEART RATE: 74 BPM | SYSTOLIC BLOOD PRESSURE: 124 MMHG

## 2023-09-28 DIAGNOSIS — K40.20 BILATERAL INGUINAL HERNIA WITHOUT OBSTRUCTION OR GANGRENE, RECURRENCE NOT SPECIFIED: Primary | ICD-10-CM

## 2023-09-28 DIAGNOSIS — Z12.11 ENCOUNTER FOR SCREENING COLONOSCOPY: ICD-10-CM

## 2023-09-28 PROCEDURE — 99203 OFFICE O/P NEW LOW 30 MIN: CPT | Performed by: SURGERY

## 2023-09-28 NOTE — PROGRESS NOTES
[facility] Note    Assessment/Plan:      Diagnosis Orders   1. Bilateral inguinal hernia without obstruction or gangrene, recurrence not specified      We will plan for repair      2. Encounter for screening colonoscopy      We will plan for colonoscopy            Return for Surgery, Colonoscopy. Chief Complaint   Patient presents with    Consultation     Ref by Dr. Leonora Brownlee for right inguinal hernia         PCP: Darlyn Diez DO    HPI: Matthew Nguyen is a 46 y.o. male who presents in consultation for bilateral inguinal hernia. He was last seen in 2019 but says he \"chickened out. \"  He complains of bulges in both groin. He has never had colonoscopy previously. His bowels are moving well. He has no abdominal pain. He has no family history of colon cancer or inflammatory bowel disease. Past Medical History:   Diagnosis Date    Anxiety     Depression     Hypertriglyceridemia     Inguinal hernia     Bilateral    OCD (obsessive compulsive disorder)        History reviewed. No pertinent surgical history. Prior to Admission medications    Medication Sig Start Date End Date Taking?  Authorizing Provider   varenicline (CHANTIX) 1 MG tablet Take 1 tablet by mouth 2 times daily 9/18/23   Ferdinand Singh DO   varenicline (CHANTIX) 0.5 MG tablet Take 1-2 tablets by mouth See Admin Instructions 0.5mg DAILY for 3 days followed by 0.5mg TWICE DAILY for 4 days followed by 1mg TWICE DAILY 9/18/23   Ferdinand Singh DO   sildenafil (REVATIO) 20 MG tablet Take  one  to  5   before intecourse 9/18/23   Ferdinand Singh DO       No Known Allergies    Social History     Socioeconomic History    Marital status: Single     Spouse name: None    Number of children: None    Years of education: None    Highest education level: None   Tobacco Use    Smoking status: Every Day     Packs/day: .5     Types: Cigarettes    Smokeless tobacco: Never   Substance and Sexual Activity    Alcohol use: Not

## 2023-10-11 ENCOUNTER — TELEPHONE (OUTPATIENT)
Dept: SURGERY | Age: 48
End: 2023-10-11

## 2023-10-11 ENCOUNTER — PREP FOR PROCEDURE (OUTPATIENT)
Dept: SURGERY | Age: 48
End: 2023-10-11

## 2023-10-11 PROBLEM — K40.90 INGUINAL HERNIA: Status: ACTIVE | Noted: 2023-10-11

## 2023-10-11 NOTE — TELEPHONE ENCOUNTER
Aimee Simmonds is scheduled for laparoscopic robotic XI assisted bilateral inguinal hernia repair with mesh with Dr Xu Mazariegos on 12-04-23 at [facility]. Patient needs to be NPO after midnight the night before procedure. All surgery instructions were explained to the patient and a surgery letter was also mailed out. MA informed patient that PAT will also be calling to review pre-op instructions and medications. Patient verbalized understanding.   Electronically signed by Balbina Chinchilla MA on 10/11/2023 at 7:40 AM

## 2023-10-11 NOTE — TELEPHONE ENCOUNTER
Prior Authorization Form:      DEMOGRAPHICS:                     Patient Name:  Brittney Cano  Patient :  1975            Insurance:  Payor: Mikaela Burkett / Plan: [place] / Product Type: *No Product type* /   Insurance ID Number:    Payer/Plan Subscr  Sex Relation Sub. Ins. ID Effective Group Num   1.  [address] 1975 Male Self [id number]                                   [address]         DIAGNOSIS & PROCEDURE:                       Procedure/Operation: Laparoscopic robotic XI assisted bilateral inguinal hernia repair with mesh            CPT Code: 87722    Diagnosis:  Bilateral inguinal hernia     ICD10 Code: K40.20    Location:  [facility]    Surgeon:  Dr Bella Chris INFORMATION:                          Date: 23    Time: 9:30 am              Anesthesia:  General                                                       Status:  Outpatient        Special Comments:         Electronically signed by Aki Dent MA on 10/11/2023 at 7:54 AM

## 2023-10-31 ENCOUNTER — PREP FOR PROCEDURE (OUTPATIENT)
Dept: SURGERY | Age: 48
End: 2023-10-31

## 2023-10-31 DIAGNOSIS — Z12.11 SCREENING FOR COLON CANCER: ICD-10-CM

## 2023-11-30 PROBLEM — Z12.11 SCREENING FOR COLON CANCER: Status: RESOLVED | Noted: 2023-10-31 | Resolved: 2023-11-30

## 2023-11-30 NOTE — PROGRESS NOTES
[facility] PRE-ADMISSION TESTING INSTRUCTIONS    The Preadmission Testing patient is instructed accordingly using the following criteria (check applicable):    ARRIVAL INSTRUCTIONS:  [x] Parking the day of Surgery is located in the Main Entrance lot. Upon entering the door, make an immediate right to the surgery reception desk    [x] Bring photo ID and insurance card    [] Bring in a copy of Living will or Durable Power of  papers. [x] Please be sure to arrange transportation to and from the hospital    [x] Please arrange for someone to be with you the remainder of the day due to having anesthesia      GENERAL INSTRUCTIONS:    [x] Nothing by mouth after midnight, including gum, candy, mints or water    [x] You may brush your teeth, but do not swallow any water    [x] Take medications as instructed with 1-2 oz of water    [] Stop herbal supplements and vitamins 5 days prior to procedure    [] Follow preop dosing of blood thinners per physician instructions    [] Do not take insulin or oral diabetic medications    [] If diabetic and have low blood sugar or feel symptomatic, take 1-2oz apple juice or glucose tablets    [] Bring inhalers day of surgery    [] Bring C-PAP/ Bi-Pap day of surgery    [] Bring urine specimen day of surgery    [x] Antibacterial Soap shower or bath AM of Surgery, no lotion, powders or creams to surgical site    [] Follow bowel prep as instructed per surgeon    [x] No tobacco products within 24 hours of surgery     [x] No alcohol or illegal drug use within 24 hours of surgery.     [x] Jewelry, body piercing's, eyeglasses, contact lenses and dentures are not permitted into surgery (bring cases)      [] Please do not wear any nail polish or make up on the day of surgery    [] If not already done, you can expect a call from registration    [x] If surgeon requests a time change you will be notified the day prior to surgery    [] If you receive a survey after

## 2023-12-04 ENCOUNTER — ANESTHESIA (OUTPATIENT)
Dept: OPERATING ROOM | Age: 48
End: 2023-12-04
Payer: COMMERCIAL

## 2023-12-04 ENCOUNTER — HOSPITAL ENCOUNTER (OUTPATIENT)
Age: 48
Setting detail: OUTPATIENT SURGERY
Discharge: HOME | End: 2023-12-04
Attending: SURGERY | Admitting: SURGERY
Payer: COMMERCIAL

## 2023-12-04 ENCOUNTER — ANESTHESIA EVENT (OUTPATIENT)
Dept: OPERATING ROOM | Age: 48
End: 2023-12-04
Payer: COMMERCIAL

## 2023-12-04 VITALS
OXYGEN SATURATION: 95 % | SYSTOLIC BLOOD PRESSURE: 125 MMHG | BODY MASS INDEX: 28.21 KG/M2 | HEIGHT: 74 IN | WEIGHT: 219.84 LBS | DIASTOLIC BLOOD PRESSURE: 78 MMHG | TEMPERATURE: 97.9 F | RESPIRATION RATE: 18 BRPM | HEART RATE: 77 BPM

## 2023-12-04 DIAGNOSIS — K40.20 BILATERAL INGUINAL HERNIA WITHOUT OBSTRUCTION OR GANGRENE, RECURRENCE NOT SPECIFIED: Primary | ICD-10-CM

## 2023-12-04 PROCEDURE — 2580000003 HC RX 258: Performed by: SURGERY

## 2023-12-04 PROCEDURE — 6370000000 HC RX 637 (ALT 250 FOR IP): Performed by: ANESTHESIOLOGY

## 2023-12-04 PROCEDURE — C1781 MESH (IMPLANTABLE): HCPCS | Performed by: SURGERY

## 2023-12-04 PROCEDURE — 2500000003 HC RX 250 WO HCPCS

## 2023-12-04 PROCEDURE — 3600000019 HC SURGERY ROBOT ADDTL 15MIN: Performed by: SURGERY

## 2023-12-04 PROCEDURE — 7100000011 HC PHASE II RECOVERY - ADDTL 15 MIN: Performed by: SURGERY

## 2023-12-04 PROCEDURE — 6360000002 HC RX W HCPCS: Performed by: SURGERY

## 2023-12-04 PROCEDURE — 7100000010 HC PHASE II RECOVERY - FIRST 15 MIN: Performed by: SURGERY

## 2023-12-04 PROCEDURE — 2500000003 HC RX 250 WO HCPCS: Performed by: SURGERY

## 2023-12-04 PROCEDURE — 2580000003 HC RX 258

## 2023-12-04 PROCEDURE — 3600000009 HC SURGERY ROBOT BASE: Performed by: SURGERY

## 2023-12-04 PROCEDURE — 49650 LAP ING HERNIA REPAIR INIT: CPT | Performed by: SURGERY

## 2023-12-04 PROCEDURE — 2709999900 HC NON-CHARGEABLE SUPPLY: Performed by: SURGERY

## 2023-12-04 PROCEDURE — 7100000001 HC PACU RECOVERY - ADDTL 15 MIN: Performed by: SURGERY

## 2023-12-04 PROCEDURE — S2900 ROBOTIC SURGICAL SYSTEM: HCPCS | Performed by: SURGERY

## 2023-12-04 PROCEDURE — 6360000002 HC RX W HCPCS: Performed by: ANESTHESIOLOGY

## 2023-12-04 PROCEDURE — 7100000000 HC PACU RECOVERY - FIRST 15 MIN: Performed by: SURGERY

## 2023-12-04 PROCEDURE — 3700000000 HC ANESTHESIA ATTENDED CARE: Performed by: SURGERY

## 2023-12-04 PROCEDURE — 3700000001 HC ADD 15 MINUTES (ANESTHESIA): Performed by: SURGERY

## 2023-12-04 PROCEDURE — 6360000002 HC RX W HCPCS

## 2023-12-04 DEVICE — LAPAROSCOPIC SELF-FIXATING MESH, RIGHT ANATOMICAL
Type: IMPLANTABLE DEVICE | Site: INGUINAL | Status: FUNCTIONAL
Brand: PROGRIP

## 2023-12-04 DEVICE — LAPAROSCOPIC SELF-FIXATING MESH, LEFT ANATOMICAL
Type: IMPLANTABLE DEVICE | Site: INGUINAL | Status: FUNCTIONAL
Brand: PROGRIP

## 2023-12-04 RX ORDER — FENTANYL CITRATE 50 UG/ML
INJECTION, SOLUTION INTRAMUSCULAR; INTRAVENOUS PRN
Status: DISCONTINUED | OUTPATIENT
Start: 2023-12-04 | End: 2023-12-04 | Stop reason: SDUPTHER

## 2023-12-04 RX ORDER — SODIUM CHLORIDE 0.9 % (FLUSH) 0.9 %
5-40 SYRINGE (ML) INJECTION PRN
Status: DISCONTINUED | OUTPATIENT
Start: 2023-12-04 | End: 2023-12-04 | Stop reason: HOSPADM

## 2023-12-04 RX ORDER — DIPHENHYDRAMINE HYDROCHLORIDE 50 MG/ML
12.5 INJECTION INTRAMUSCULAR; INTRAVENOUS
Status: DISCONTINUED | OUTPATIENT
Start: 2023-12-04 | End: 2023-12-04 | Stop reason: HOSPADM

## 2023-12-04 RX ORDER — MEPERIDINE HYDROCHLORIDE 25 MG/ML
12.5 INJECTION INTRAMUSCULAR; INTRAVENOUS; SUBCUTANEOUS EVERY 5 MIN PRN
Status: DISCONTINUED | OUTPATIENT
Start: 2023-12-04 | End: 2023-12-04 | Stop reason: HOSPADM

## 2023-12-04 RX ORDER — SODIUM CHLORIDE 9 MG/ML
INJECTION, SOLUTION INTRAVENOUS CONTINUOUS PRN
Status: DISCONTINUED | OUTPATIENT
Start: 2023-12-04 | End: 2023-12-04 | Stop reason: SDUPTHER

## 2023-12-04 RX ORDER — LIDOCAINE HYDROCHLORIDE 20 MG/ML
INJECTION, SOLUTION EPIDURAL; INFILTRATION; INTRACAUDAL; PERINEURAL PRN
Status: DISCONTINUED | OUTPATIENT
Start: 2023-12-04 | End: 2023-12-04 | Stop reason: SDUPTHER

## 2023-12-04 RX ORDER — BUPIVACAINE HYDROCHLORIDE AND EPINEPHRINE 2.5; 5 MG/ML; UG/ML
INJECTION, SOLUTION EPIDURAL; INFILTRATION; INTRACAUDAL; PERINEURAL PRN
Status: DISCONTINUED | OUTPATIENT
Start: 2023-12-04 | End: 2023-12-04 | Stop reason: ALTCHOICE

## 2023-12-04 RX ORDER — DEXAMETHASONE SODIUM PHOSPHATE 4 MG/ML
INJECTION, SOLUTION INTRA-ARTICULAR; INTRALESIONAL; INTRAMUSCULAR; INTRAVENOUS; SOFT TISSUE PRN
Status: DISCONTINUED | OUTPATIENT
Start: 2023-12-04 | End: 2023-12-04 | Stop reason: SDUPTHER

## 2023-12-04 RX ORDER — OXYCODONE HYDROCHLORIDE 5 MG/1
10 TABLET ORAL PRN
Status: COMPLETED | OUTPATIENT
Start: 2023-12-04 | End: 2023-12-04

## 2023-12-04 RX ORDER — GLYCOPYRROLATE 0.2 MG/ML
INJECTION INTRAMUSCULAR; INTRAVENOUS PRN
Status: DISCONTINUED | OUTPATIENT
Start: 2023-12-04 | End: 2023-12-04 | Stop reason: SDUPTHER

## 2023-12-04 RX ORDER — ROCURONIUM BROMIDE 10 MG/ML
INJECTION, SOLUTION INTRAVENOUS PRN
Status: DISCONTINUED | OUTPATIENT
Start: 2023-12-04 | End: 2023-12-04 | Stop reason: SDUPTHER

## 2023-12-04 RX ORDER — SODIUM CHLORIDE 9 MG/ML
INJECTION, SOLUTION INTRAVENOUS PRN
Status: DISCONTINUED | OUTPATIENT
Start: 2023-12-04 | End: 2023-12-04 | Stop reason: HOSPADM

## 2023-12-04 RX ORDER — MIDAZOLAM HYDROCHLORIDE 1 MG/ML
INJECTION INTRAMUSCULAR; INTRAVENOUS PRN
Status: DISCONTINUED | OUTPATIENT
Start: 2023-12-04 | End: 2023-12-04 | Stop reason: SDUPTHER

## 2023-12-04 RX ORDER — KETAMINE HYDROCHLORIDE 10 MG/ML
INJECTION INTRAMUSCULAR; INTRAVENOUS PRN
Status: DISCONTINUED | OUTPATIENT
Start: 2023-12-04 | End: 2023-12-04 | Stop reason: SDUPTHER

## 2023-12-04 RX ORDER — SODIUM CHLORIDE 0.9 % (FLUSH) 0.9 %
5-40 SYRINGE (ML) INJECTION EVERY 12 HOURS SCHEDULED
Status: DISCONTINUED | OUTPATIENT
Start: 2023-12-04 | End: 2023-12-04 | Stop reason: HOSPADM

## 2023-12-04 RX ORDER — OXYCODONE HYDROCHLORIDE AND ACETAMINOPHEN 5; 325 MG/1; MG/1
1 TABLET ORAL EVERY 6 HOURS PRN
Qty: 12 TABLET | Refills: 0 | Status: SHIPPED | OUTPATIENT
Start: 2023-12-04 | End: 2023-12-07

## 2023-12-04 RX ORDER — PROPOFOL 10 MG/ML
INJECTION, EMULSION INTRAVENOUS PRN
Status: DISCONTINUED | OUTPATIENT
Start: 2023-12-04 | End: 2023-12-04 | Stop reason: SDUPTHER

## 2023-12-04 RX ORDER — ONDANSETRON 2 MG/ML
INJECTION INTRAMUSCULAR; INTRAVENOUS PRN
Status: DISCONTINUED | OUTPATIENT
Start: 2023-12-04 | End: 2023-12-04 | Stop reason: SDUPTHER

## 2023-12-04 RX ORDER — OXYCODONE HYDROCHLORIDE 5 MG/1
5 TABLET ORAL PRN
Status: COMPLETED | OUTPATIENT
Start: 2023-12-04 | End: 2023-12-04

## 2023-12-04 RX ORDER — KETOROLAC TROMETHAMINE 30 MG/ML
INJECTION, SOLUTION INTRAMUSCULAR; INTRAVENOUS PRN
Status: DISCONTINUED | OUTPATIENT
Start: 2023-12-04 | End: 2023-12-04 | Stop reason: SDUPTHER

## 2023-12-04 RX ADMIN — ONDANSETRON 4 MG: 2 INJECTION INTRAMUSCULAR; INTRAVENOUS at 10:25

## 2023-12-04 RX ADMIN — SUGAMMADEX 200 MG: 100 INJECTION, SOLUTION INTRAVENOUS at 10:34

## 2023-12-04 RX ADMIN — ROCURONIUM BROMIDE 10 MG: 10 INJECTION, SOLUTION INTRAVENOUS at 10:04

## 2023-12-04 RX ADMIN — ROCURONIUM BROMIDE 50 MG: 10 INJECTION, SOLUTION INTRAVENOUS at 09:18

## 2023-12-04 RX ADMIN — FENTANYL CITRATE 50 MCG: 50 INJECTION, SOLUTION INTRAMUSCULAR; INTRAVENOUS at 09:33

## 2023-12-04 RX ADMIN — KETAMINE HYDROCHLORIDE 40 MG: 10 INJECTION INTRAMUSCULAR; INTRAVENOUS at 09:18

## 2023-12-04 RX ADMIN — HYDROMORPHONE HYDROCHLORIDE 0.5 MG: 1 INJECTION, SOLUTION INTRAMUSCULAR; INTRAVENOUS; SUBCUTANEOUS at 11:09

## 2023-12-04 RX ADMIN — MIDAZOLAM 2 MG: 1 INJECTION INTRAMUSCULAR; INTRAVENOUS at 09:12

## 2023-12-04 RX ADMIN — DEXAMETHASONE SODIUM PHOSPHATE 10 MG: 4 INJECTION, SOLUTION INTRAMUSCULAR; INTRAVENOUS at 09:33

## 2023-12-04 RX ADMIN — FENTANYL CITRATE 50 MCG: 50 INJECTION, SOLUTION INTRAMUSCULAR; INTRAVENOUS at 10:41

## 2023-12-04 RX ADMIN — FENTANYL CITRATE 100 MCG: 50 INJECTION, SOLUTION INTRAMUSCULAR; INTRAVENOUS at 09:18

## 2023-12-04 RX ADMIN — WATER 2000 MG: 1 INJECTION INTRAMUSCULAR; INTRAVENOUS; SUBCUTANEOUS at 09:29

## 2023-12-04 RX ADMIN — LIDOCAINE HYDROCHLORIDE 100 MG: 20 INJECTION, SOLUTION EPIDURAL; INFILTRATION; INTRACAUDAL; PERINEURAL at 09:18

## 2023-12-04 RX ADMIN — GLYCOPYRROLATE 0.1 MG: 0.2 INJECTION INTRAMUSCULAR; INTRAVENOUS at 09:33

## 2023-12-04 RX ADMIN — FENTANYL CITRATE 50 MCG: 50 INJECTION, SOLUTION INTRAMUSCULAR; INTRAVENOUS at 10:44

## 2023-12-04 RX ADMIN — OXYCODONE 10 MG: 5 TABLET ORAL at 12:07

## 2023-12-04 RX ADMIN — PROPOFOL 150 MG: 10 INJECTION, EMULSION INTRAVENOUS at 09:18

## 2023-12-04 RX ADMIN — KETOROLAC TROMETHAMINE 30 MG: 30 INJECTION, SOLUTION INTRAMUSCULAR; INTRAVENOUS at 10:25

## 2023-12-04 RX ADMIN — SODIUM CHLORIDE: 9 INJECTION, SOLUTION INTRAVENOUS at 09:14

## 2023-12-04 ASSESSMENT — PAIN SCALES - GENERAL
PAINLEVEL_OUTOF10: 8
PAINLEVEL_OUTOF10: 6
PAINLEVEL_OUTOF10: 7
PAINLEVEL_OUTOF10: 0

## 2023-12-04 ASSESSMENT — PAIN DESCRIPTION - LOCATION
LOCATION: ABDOMEN

## 2023-12-04 ASSESSMENT — PAIN DESCRIPTION - DESCRIPTORS
DESCRIPTORS: ACHING;DISCOMFORT;SORE
DESCRIPTORS: DISCOMFORT
DESCRIPTORS: ACHING;DISCOMFORT

## 2023-12-04 ASSESSMENT — PAIN - FUNCTIONAL ASSESSMENT
PAIN_FUNCTIONAL_ASSESSMENT: 0-10
PAIN_FUNCTIONAL_ASSESSMENT: PREVENTS OR INTERFERES SOME ACTIVE ACTIVITIES AND ADLS

## 2023-12-04 ASSESSMENT — LIFESTYLE VARIABLES: SMOKING_STATUS: 1

## 2023-12-04 ASSESSMENT — PAIN DESCRIPTION - ORIENTATION
ORIENTATION: ANTERIOR
ORIENTATION: ANTERIOR

## 2023-12-04 NOTE — H&P
General: He is not in acute distress. Appearance: He is not diaphoretic. HENT:      Head: Normocephalic and atraumatic. Eyes:      General:         Right eye: No discharge. Left eye: No discharge. Neck:      Trachea: No tracheal deviation. Cardiovascular:      Rate and Rhythm: Normal rate. Pulmonary:      Effort: Pulmonary effort is normal. No respiratory distress. Abdominal:      General: There is no distension. Palpations: Abdomen is soft. Tenderness: There is no abdominal tenderness. There is no guarding or rebound. Comments: Bilateral inguinal hernia   Skin:     General: Skin is warm and dry. Neurological:      Mental Status: He is alert and oriented to person, place, and time.             CBC:         Lab Results   Component Value Date/Time     WBC 6.6 09/12/2023 10:31 AM     RBC 4.78 09/12/2023 10:31 AM     HGB 15.4 09/12/2023 10:31 AM     HCT 44.8 09/12/2023 10:31 AM     MCV 93.7 09/12/2023 10:31 AM     MCH 32.2 09/12/2023 10:31 AM     MCHC 34.4 09/12/2023 10:31 AM     RDW 12.4 09/12/2023 10:31 AM      09/12/2023 10:31 AM     MPV 9.8 09/12/2023 10:31 AM      CMP:          Lab Results   Component Value Date/Time      09/12/2023 10:31 AM     K 4.7 09/12/2023 10:31 AM      09/12/2023 10:31 AM     CO2 17 09/12/2023 10:31 AM     BUN 10 09/12/2023 10:31 AM     CREATININE 1.0 09/12/2023 10:31 AM     GFRAA >60 03/02/2021 08:22 AM     LABGLOM >60 09/12/2023 10:31 AM     GLUCOSE 90 09/12/2023 10:31 AM     PROT 7.0 09/12/2023 10:31 AM     LABALBU 4.5 09/12/2023 10:31 AM     CALCIUM 9.2 09/12/2023 10:31 AM     BILITOT 0.2 09/12/2023 10:31 AM     ALKPHOS 65 09/12/2023 10:31 AM     AST 29 09/12/2023 10:31 AM     ALT 47 09/12/2023 10:31 AM      PT/INR:  No results found for: \"PROTIME\", \"INR\"  HgBA1c:  No results found for: \"LABA1C\"  LIPASE:  No results found for: \"LIPASE\"         Loco Zimmerman MD     I have examined the patient and performed the key aspects of

## 2023-12-04 NOTE — OP NOTE
Operative Note    Marcelino Weller  YOB: 1975  [id number]    Pre-operative Diagnosis: Inguinal hernia [K40.90], bilateral    Post-operative Diagnosis: Inguinal hernia [K40.90], bilateral    Procedure(s):  LAPAROSCOPIC ROBOTIC XI ASSISTED BILATERAL INGUINAL HERNIA REPAIR WITH MESH    Implant Name Type Inv. Item Serial No.  Lot No. LRB No. Used Action   MESH JONY LAP SELF FIXATING LT SHAZIA POLYLACTIC ACID PROGRIP - [id number]  MESH JONY LAP SELF FIXATING LT SHAZIA POLYLACTIC ACID PROGRIP  [employer]  SURGICAL-WD [id number] Left 1 Implanted   MESH JONY [id number] TEXTILE MFIL Berenice Ramos TEREPHTHALATE - [id number]  MESH JONY [id number] TEXTILE MFIL POLYETH TEREPHTHALATE  [employer]  [address] [id number] Right 1 Implanted       Surgeon: Kamilah Grigsby MD    Staff:  Scrub Person First: Alyssa Hallman RN    Anesthesia:   General    Estimated Blood Loss: less than 50     Complications:   None    Specimens: * No specimens in log *    Findings: Bilateral direct inguinal Hernia    Indications: Patient is a 46 y.o. male who was diagnosed with Bilateral Inguinal Hernia. Risks/Benefits/Alternatives were discussed with the patient, including bleeding, infection, iatrogenic injury to surrounding structures, nerve/vascular injury, recurrence, need for further operations. The patient agreed to undergo the proceed and informed consent was obtained. Procedure: After informed consent, the patient was brought to the operating room and placed Supine. General anesthesia was then induced which the patient tolerated well. Time out was performed to identify the correct patient and procedure. They received appropriate perioperative antibiotics. The abdomen and genitalia were prepped and draped in the usual sterile fashion.     Pneumoperitoneum was created with Veress in the left upper quadrant at the site of one of the proposed robotic trocars and 8mm robotic optiview port used in this location for

## 2023-12-04 NOTE — ANESTHESIA POSTPROCEDURE EVALUATION
Department of Anesthesiology  Postprocedure Note    Patient: Raheem Mckeon  MRN: [id number]  YOB: 1975  Date of evaluation: 12/4/2023      Procedure Summary       Date: 12/04/23 Room / Location: [facility] OR 10 / [facility]    Anesthesia Start: 7724 Anesthesia Stop: 8868    Procedure: [facility] (Bilateral) Diagnosis:       Inguinal hernia      (Inguinal hernia [K40.90])    Surgeons: Ta Tang MD Responsible Provider: Theo Zavala MD    Anesthesia Type: General ASA Status: 2            Anesthesia Type: General    Shell Phase I: Shell Score: 10    Shell Phase II: Shell Score: 10      Anesthesia Post Evaluation    Patient location during evaluation: PACU  Patient participation: complete - patient participated  Level of consciousness: awake and alert  Airway patency: patent  Nausea & Vomiting: no nausea and no vomiting  Complications: no  Cardiovascular status: hemodynamically stable  Respiratory status: acceptable  Hydration status: euvolemic  Multimodal analgesia pain management approach  Pain management: adequate

## 2023-12-07 ENCOUNTER — TELEPHONE (OUTPATIENT)
Dept: SURGERY | Age: 48
End: 2023-12-07

## 2023-12-07 NOTE — TELEPHONE ENCOUNTER
Yung Ray is scheduled for colon with Dr Ani Villafana on 2/20/24 at 1030am. Patient needs to be NPO after midnight the night before procedure. All surgery instructions were explained to the patient and a surgery letter was also mailed out. MA informed patient that PAT will also be calling to review pre-op instructions and medications. Patient verbalized understanding.

## 2023-12-07 NOTE — TELEPHONE ENCOUNTER
Prior Authorization Form:      DEMOGRAPHICS:                     Patient Name:  Abraham Hernadez  Patient :  1975            Insurance:  Payor: Diego Burnett / Plan: [place] / Product Type: *No Product type* /   Insurance ID Number:    Payer/Plan Subscr  Sex Relation Sub. Ins. ID Effective Group Num   1.  [address] 1975 Male Self [id number]                                   [address]         DIAGNOSIS & PROCEDURE:                       Procedure/Operation: colon           CPT Code: 87655    Diagnosis:  screening    ICD10 Code: z12.11    Location:  [facility]    Surgeon:  Juan Castañeda INFORMATION:                          Date: 24    Time: 1030am              Anesthesia:  MAC/TIVA                                                       Status:  Outpatient        Special Comments:         Electronically signed by Jordan Galvan MA on 2023 at 10:40 AM

## 2023-12-12 ENCOUNTER — OFFICE VISIT (OUTPATIENT)
Dept: FAMILY MEDICINE CLINIC | Age: 48
End: 2023-12-12
Payer: COMMERCIAL

## 2023-12-12 VITALS
BODY MASS INDEX: 29.52 KG/M2 | SYSTOLIC BLOOD PRESSURE: 112 MMHG | OXYGEN SATURATION: 97 % | TEMPERATURE: 97.7 F | WEIGHT: 230 LBS | HEART RATE: 84 BPM | HEIGHT: 74 IN | RESPIRATION RATE: 18 BRPM | DIASTOLIC BLOOD PRESSURE: 72 MMHG

## 2023-12-12 DIAGNOSIS — J06.9 URI WITH COUGH AND CONGESTION: Primary | ICD-10-CM

## 2023-12-12 DIAGNOSIS — R05.9 COUGH, UNSPECIFIED TYPE: ICD-10-CM

## 2023-12-12 LAB
Lab: NORMAL
PERFORMING INSTRUMENT: NORMAL
QC PASS/FAIL: NORMAL
SARS-COV-2, POC: NORMAL

## 2023-12-12 PROCEDURE — 99213 OFFICE O/P EST LOW 20 MIN: CPT

## 2023-12-12 PROCEDURE — 87426 SARSCOV CORONAVIRUS AG IA: CPT

## 2023-12-12 RX ORDER — AZITHROMYCIN 250 MG/1
TABLET, FILM COATED ORAL
Qty: 6 TABLET | Refills: 0 | Status: SHIPPED | OUTPATIENT
Start: 2023-12-12

## 2023-12-12 RX ORDER — BENZONATATE 100 MG/1
100 CAPSULE ORAL 3 TIMES DAILY PRN
Qty: 21 CAPSULE | Refills: 0 | Status: SHIPPED | OUTPATIENT
Start: 2023-12-12 | End: 2023-12-19

## 2024-02-15 PROBLEM — Z12.11 SCREENING FOR COLON CANCER: Status: ACTIVE | Noted: 2023-10-31

## 2024-02-16 RX ORDER — M-VIT,TX,IRON,MINS/CALC/FOLIC 27MG-0.4MG
1 TABLET ORAL DAILY
COMMUNITY

## 2024-02-16 NOTE — PROGRESS NOTES
[facility] PRE-ADMISSION TESTING INSTRUCTIONS    The Preadmission Testing patient is instructed accordingly using the following criteria (check applicable):    ARRIVAL INSTRUCTIONS:  [x] Parking the day of Surgery is located in the Main Entrance lot.  Upon entering the door, make an immediate right to the surgery reception desk    [x] Bring photo ID and insurance card    [] Bring in a copy of Living will or Durable Power of  papers.    [x] Please be sure to arrange for responsible adult to provide transportation to and from the hospital    [x] Please arrange for responsible adult to be with you for the 24 hour period post procedure due to having anesthesia    [x] If you awake am of surgery not feeling well or have temperature >100 please call [phone]    GENERAL INSTRUCTIONS:    [x] May have clear liquids until 2 hours prior to surgery. Examples include water, fruit juices (no pulp), jello, popsicles, black coffee or tea, beef or chicken broth.       May have light meal 6 hours prior to surgery. Example include toast and clear liquids.        No gum, candy or mints.    [x] You may brush your teeth, but do not swallow any water    [] Take medications as instructed with 1-2 oz of water    [x] Stop herbal supplements and vitamins 5 days prior to procedure    [x] Follow preop dosing of blood thinners per physician instructions    [] Take 1/2 dose of evening insulin, but no insulin after midnight    [] No oral diabetic medications after midnight    [] If diabetic and have low blood sugar or feel symptomatic, take 1-2oz apple juice only    [] Bring inhalers day of surgery    [] Bring C-PAP/ Bi-Pap day of surgery    [] Bring urine specimen day of surgery    [x] Shower or bath with soap, lather and rinse well, AM of Surgery, no lotion, powders or creams to surgical site    [x] Follow bowel prep as instructed per surgeon    [x] No tobacco products within 24 hours of surgery     [x] No

## 2024-02-20 ENCOUNTER — ANESTHESIA (OUTPATIENT)
Dept: ENDOSCOPY | Age: 49
End: 2024-02-20
Payer: COMMERCIAL

## 2024-02-20 ENCOUNTER — HOSPITAL ENCOUNTER (OUTPATIENT)
Age: 49
Setting detail: OUTPATIENT SURGERY
Discharge: HOME | End: 2024-02-20
Attending: SURGERY | Admitting: SURGERY
Payer: COMMERCIAL

## 2024-02-20 ENCOUNTER — ANESTHESIA EVENT (OUTPATIENT)
Dept: ENDOSCOPY | Age: 49
End: 2024-02-20
Payer: COMMERCIAL

## 2024-02-20 VITALS
OXYGEN SATURATION: 97 % | TEMPERATURE: 97 F | SYSTOLIC BLOOD PRESSURE: 110 MMHG | WEIGHT: 227 LBS | HEIGHT: 74 IN | RESPIRATION RATE: 18 BRPM | DIASTOLIC BLOOD PRESSURE: 67 MMHG | BODY MASS INDEX: 29.13 KG/M2 | HEART RATE: 84 BPM

## 2024-02-20 DIAGNOSIS — Z12.11 SCREENING FOR COLON CANCER: ICD-10-CM

## 2024-02-20 PROCEDURE — 88305 TISSUE EXAM BY PATHOLOGIST: CPT

## 2024-02-20 PROCEDURE — 7100000011 HC PHASE II RECOVERY - ADDTL 15 MIN: Performed by: SURGERY

## 2024-02-20 PROCEDURE — 3700000001 HC ADD 15 MINUTES (ANESTHESIA): Performed by: SURGERY

## 2024-02-20 PROCEDURE — 45385 COLONOSCOPY W/LESION REMOVAL: CPT | Performed by: SURGERY

## 2024-02-20 PROCEDURE — 3700000000 HC ANESTHESIA ATTENDED CARE: Performed by: SURGERY

## 2024-02-20 PROCEDURE — 7100000010 HC PHASE II RECOVERY - FIRST 15 MIN: Performed by: SURGERY

## 2024-02-20 PROCEDURE — 2709999900 HC NON-CHARGEABLE SUPPLY: Performed by: SURGERY

## 2024-02-20 PROCEDURE — 6360000002 HC RX W HCPCS

## 2024-02-20 PROCEDURE — 3609010600 HC COLONOSCOPY POLYPECTOMY SNARE/COLD BIOPSY: Performed by: SURGERY

## 2024-02-20 PROCEDURE — 2580000003 HC RX 258

## 2024-02-20 RX ORDER — PROPOFOL 10 MG/ML
INJECTION, EMULSION INTRAVENOUS PRN
Status: DISCONTINUED | OUTPATIENT
Start: 2024-02-20 | End: 2024-02-20 | Stop reason: SDUPTHER

## 2024-02-20 RX ORDER — SODIUM CHLORIDE 9 MG/ML
INJECTION, SOLUTION INTRAVENOUS CONTINUOUS PRN
Status: DISCONTINUED | OUTPATIENT
Start: 2024-02-20 | End: 2024-02-20 | Stop reason: SDUPTHER

## 2024-02-20 RX ADMIN — PROPOFOL 380 MG: 10 INJECTION, EMULSION INTRAVENOUS at 10:20

## 2024-02-20 RX ADMIN — SODIUM CHLORIDE: 9 INJECTION, SOLUTION INTRAVENOUS at 10:00

## 2024-02-20 ASSESSMENT — PAIN SCALES - GENERAL
PAINLEVEL_OUTOF10: 0

## 2024-02-20 ASSESSMENT — PAIN - FUNCTIONAL ASSESSMENT: PAIN_FUNCTIONAL_ASSESSMENT: 0-10

## 2024-02-20 ASSESSMENT — LIFESTYLE VARIABLES: SMOKING_STATUS: 1

## 2024-02-20 NOTE — H&P
LABGLOM >60 09/12/2023 10:31 AM     GLUCOSE 90 09/12/2023 10:31 AM     PROT 7.0 09/12/2023 10:31 AM     LABALBU 4.5 09/12/2023 10:31 AM     CALCIUM 9.2 09/12/2023 10:31 AM     BILITOT 0.2 09/12/2023 10:31 AM     ALKPHOS 65 09/12/2023 10:31 AM     AST 29 09/12/2023 10:31 AM     ALT 47 09/12/2023 10:31 AM      PT/INR:  No results found for: \"PROTIME\", \"INR\"  HgBA1c:  No results found for: \"LABA1C\"  LIPASE:  No results found for: \"LIPASE\"         Luis Zuñiga MD        I have examined the patient and performed the key aspects of the physical exam,and reviewed the record (including laboratory findings, results, and all pertinent radiology images, which are independently reviewed and interpreted unless otherwise explicitly stated). The referring provider's notes were also reviewed.     Any procedures planned or discussed as above had risks, benefits, and reasonable alternatives thoroughly discussed with the patient or responsible party.     NOTE: This report, in part or full, may have been transcribed using voice recognition software. Every effort was made to ensure accuracy; however, inadvertent computerized transcription errors may be present. Please excuse any transcriptional grammatical or spelling errors that may have escaped my editorial review.        CC: Ferdinand Singh, DO

## 2024-02-20 NOTE — ANESTHESIA PRE PROCEDURE
Department of Anesthesiology  Preprocedure Note       Name:  Papito Alexandra   Age:  48 y.o.  :  1975                                          MRN:  [id number]         Date:  2024      Surgeon: Surgeon(s):  Luis Zuñiga MD    Procedure: Procedure(s):  COLORECTAL CANCER SCREENING, NOT HIGH RISK    Medications prior to admission:   Prior to Admission medications    Medication Sig Start Date End Date Taking? Authorizing Provider   Multiple Vitamins-Minerals (THERAPEUTIC MULTIVITAMIN-MINERALS) tablet Take 1 tablet by mouth daily   Yes Provider, MD Mihaela   varenicline (CHANTIX) 1 MG tablet Take 1 tablet by mouth 2 times daily  Patient not taking: Reported on 2024   Ferdinand Singh DO   varenicline (CHANTIX) 0.5 MG tablet Take 1-2 tablets by mouth See Admin Instructions 0.5mg DAILY for 3 days followed by 0.5mg TWICE DAILY for 4 days followed by 1mg TWICE DAILY  Patient not taking: Reported on 2024   Ferdinand Singh DO   sildenafil (REVATIO) 20 MG tablet Take  one  to  5   before intecourse 23   Ferdinand Singh DO       Current medications:    No current facility-administered medications for this encounter.       Allergies:  No Known Allergies    Problem List:    Patient Active Problem List   Diagnosis Code    Anxiety F41.9    Dysthymia F34.1    Mixed hyperlipidemia E78.2    Right inguinal hernia K40.90    Exposure to COVID-19 virus Z20.822    Impacted cerumen, left ear H61.22    Elevated liver function tests R79.89    Inguinal hernia K40.90    Screening for colon cancer Z12.11       Past Medical History:        Diagnosis Date    Anxiety     Depression     Inguinal hernia     Bilateral       Past Surgical History:        Procedure Laterality Date    INGUINAL HERNIA REPAIR Bilateral 2023    LAPAROSCOPIC ROBOTIC XI ASSISTED BILATERAL INGUINAL HERNIA REPAIR WITH MESH performed by Luis Zuñiga MD at [facility] OR       Social History:    Social History     Tobacco Use

## 2024-02-22 LAB — SURGICAL PATHOLOGY REPORT: NORMAL

## 2024-02-22 NOTE — ANESTHESIA POSTPROCEDURE EVALUATION
Department of Anesthesiology  Postprocedure Note    Patient: Papito Alexandra  MRN: [id number]  YOB: 1975  Date of evaluation: 2/21/2024    Procedure Summary     Date: 02/20/24 Room / Location: [place] [address] / [facility]    Anesthesia Start: 1013 Anesthesia Stop: 1044    Procedure: COLONOSCOPY POLYPECTOMY SNARE/COLD BIOPSY Diagnosis:       Screening for colon cancer      (Screening for colon cancer [Z12.11])    Surgeons: Luis Zuñiga MD Responsible Provider: Daren Burnette DO    Anesthesia Type: MAC ASA Status: 3          Anesthesia Type: MAC    Shell Phase I: Shell Score: 10    Shell Phase II: Shell Score: 10    Anesthesia Post Evaluation    Patient location during evaluation: PACU  Patient participation: complete - patient participated  Level of consciousness: awake and alert  Pain score: 1  Airway patency: patent  Nausea & Vomiting: no nausea and no vomiting  Cardiovascular status: hemodynamically stable  Respiratory status: acceptable  Hydration status: euvolemic  Pain management: adequate        No notable events documented.

## 2024-03-04 ENCOUNTER — TELEPHONE (OUTPATIENT)
Dept: SURGERY | Age: 49
End: 2024-03-04

## 2024-03-04 NOTE — TELEPHONE ENCOUNTER
Patient advised colonoscopy pathology was benign. Will need colonoscopy repeated in 5 years. Pt verbalized understanding. Electronically signed by Morelia Morales MA on 3/4/2024 at 10:47 AM

## 2024-03-04 NOTE — TELEPHONE ENCOUNTER
----- Message from uLis Zuñiga MD sent at 3/1/2024  9:33 PM EST -----  Can let him know pathology benign.  Repeat in 5 years.  ----- Message -----  From: Alek Martin Incoming Lab Results From [place]  Sent: 2/22/2024   1:30 PM EST  To: Luis Zuñiga MD

## 2024-03-14 DIAGNOSIS — R79.89 ELEVATED LIVER FUNCTION TESTS: ICD-10-CM

## 2024-03-14 DIAGNOSIS — E78.2 MIXED HYPERLIPIDEMIA: ICD-10-CM

## 2024-03-15 LAB
ALBUMIN SERPL-MCNC: 4.3 G/DL (ref 3.5–5.2)
ALP BLD-CCNC: 72 U/L (ref 40–129)
ALT SERPL-CCNC: 44 U/L (ref 0–40)
ANION GAP SERPL CALCULATED.3IONS-SCNC: 16 MMOL/L (ref 7–16)
AST SERPL-CCNC: 26 U/L (ref 0–39)
BILIRUB SERPL-MCNC: 0.3 MG/DL (ref 0–1.2)
BUN BLDV-MCNC: 16 MG/DL (ref 6–20)
CALCIUM SERPL-MCNC: 9.5 MG/DL (ref 8.6–10.2)
CHLORIDE BLD-SCNC: 106 MMOL/L (ref 98–107)
CHOLESTEROL: 181 MG/DL
CO2: 19 MMOL/L (ref 22–29)
CREAT SERPL-MCNC: 1.1 MG/DL (ref 0.7–1.2)
GFR SERPL CREATININE-BSD FRML MDRD: >60 ML/MIN/1.73M2
GLUCOSE BLD-MCNC: 112 MG/DL (ref 74–99)
HDLC SERPL-MCNC: 43 MG/DL
LDL CHOLESTEROL: 114 MG/DL
POTASSIUM SERPL-SCNC: 4.5 MMOL/L (ref 3.5–5)
SODIUM BLD-SCNC: 141 MMOL/L (ref 132–146)
TOTAL PROTEIN: 6.9 G/DL (ref 6.4–8.3)
TRIGL SERPL-MCNC: 119 MG/DL
VLDLC SERPL CALC-MCNC: 24 MG/DL

## 2024-03-16 PROBLEM — Z12.11 SCREENING FOR COLON CANCER: Status: RESOLVED | Noted: 2023-10-31 | Resolved: 2024-03-16

## 2024-03-18 ENCOUNTER — OFFICE VISIT (OUTPATIENT)
Dept: PRIMARY CARE CLINIC | Age: 49
End: 2024-03-18
Payer: COMMERCIAL

## 2024-03-18 VITALS
TEMPERATURE: 98.6 F | SYSTOLIC BLOOD PRESSURE: 127 MMHG | WEIGHT: 231 LBS | DIASTOLIC BLOOD PRESSURE: 78 MMHG | BODY MASS INDEX: 29.65 KG/M2 | HEIGHT: 74 IN

## 2024-03-18 DIAGNOSIS — I10 ESSENTIAL HYPERTENSION: ICD-10-CM

## 2024-03-18 DIAGNOSIS — R79.89 ELEVATED LIVER FUNCTION TESTS: ICD-10-CM

## 2024-03-18 DIAGNOSIS — E55.9 VITAMIN D DEFICIENCY: ICD-10-CM

## 2024-03-18 DIAGNOSIS — Z00.01 ENCOUNTER FOR ANNUAL GENERAL MEDICAL EXAMINATION WITH ABNORMAL FINDINGS IN ADULT: ICD-10-CM

## 2024-03-18 DIAGNOSIS — R73.01 IMPAIRED FASTING GLUCOSE: ICD-10-CM

## 2024-03-18 DIAGNOSIS — E78.2 MIXED HYPERLIPIDEMIA: Primary | ICD-10-CM

## 2024-03-18 LAB — HBA1C MFR BLD: 5.5 %

## 2024-03-18 PROCEDURE — 83036 HEMOGLOBIN GLYCOSYLATED A1C: CPT | Performed by: FAMILY MEDICINE

## 2024-03-18 PROCEDURE — 3074F SYST BP LT 130 MM HG: CPT | Performed by: FAMILY MEDICINE

## 2024-03-18 PROCEDURE — 3078F DIAST BP <80 MM HG: CPT | Performed by: FAMILY MEDICINE

## 2024-03-18 PROCEDURE — 93000 ELECTROCARDIOGRAM COMPLETE: CPT | Performed by: FAMILY MEDICINE

## 2024-03-18 PROCEDURE — 99214 OFFICE O/P EST MOD 30 MIN: CPT | Performed by: FAMILY MEDICINE

## 2024-03-18 SDOH — ECONOMIC STABILITY: FOOD INSECURITY: WITHIN THE PAST 12 MONTHS, YOU WORRIED THAT YOUR FOOD WOULD RUN OUT BEFORE YOU GOT MONEY TO BUY MORE.: NEVER TRUE

## 2024-03-18 SDOH — ECONOMIC STABILITY: FOOD INSECURITY: WITHIN THE PAST 12 MONTHS, THE FOOD YOU BOUGHT JUST DIDN'T LAST AND YOU DIDN'T HAVE MONEY TO GET MORE.: NEVER TRUE

## 2024-03-18 SDOH — ECONOMIC STABILITY: INCOME INSECURITY: HOW HARD IS IT FOR YOU TO PAY FOR THE VERY BASICS LIKE FOOD, HOUSING, MEDICAL CARE, AND HEATING?: NOT HARD AT ALL

## 2024-03-18 ASSESSMENT — PATIENT HEALTH QUESTIONNAIRE - PHQ9
SUM OF ALL RESPONSES TO PHQ9 QUESTIONS 1 & 2: 0
1. LITTLE INTEREST OR PLEASURE IN DOING THINGS: NOT AT ALL
SUM OF ALL RESPONSES TO PHQ QUESTIONS 1-9: 0
2. FEELING DOWN, DEPRESSED OR HOPELESS: NOT AT ALL
SUM OF ALL RESPONSES TO PHQ QUESTIONS 1-9: 0

## 2024-03-18 ASSESSMENT — ENCOUNTER SYMPTOMS
RESPIRATORY NEGATIVE: 1
ALLERGIC/IMMUNOLOGIC NEGATIVE: 1
EYES NEGATIVE: 1
GASTROINTESTINAL NEGATIVE: 1

## 2024-03-18 NOTE — PROGRESS NOTES
3/18/24  Name: Papito Alexandra    : 1975    Sex: male    Age: 48 y.o.        Subjective:  Chief Complaint: Patient is here for 6 mo ck  re      chol  LFT   hernia colon  smoking      etoh        Feels ok    smomgna dn eto h stil  Had camilla or and  happy  Colon due   5 years  la biwth  fbs sl inc       Lft sl  better          Review of Systems   Constitutional: Negative.    HENT: Negative.     Eyes: Negative.    Respiratory: Negative.     Cardiovascular: Negative.    Gastrointestinal: Negative.    Endocrine: Negative.    Genitourinary: Negative.    Musculoskeletal: Negative.    Skin: Negative.    Allergic/Immunologic: Negative.    Neurological: Negative.    Hematological: Negative.    Psychiatric/Behavioral: Negative.           Current Outpatient Medications:     Multiple Vitamins-Minerals (THERAPEUTIC MULTIVITAMIN-MINERALS) tablet, Take 1 tablet by mouth daily, Disp: , Rfl:     varenicline (CHANTIX) 1 MG tablet, Take 1 tablet by mouth 2 times daily (Patient not taking: Reported on 2024), Disp: 180 tablet, Rfl: 1    varenicline (CHANTIX) 0.5 MG tablet, Take 1-2 tablets by mouth See Admin Instructions 0.5mg DAILY for 3 days followed by 0.5mg TWICE DAILY for 4 days followed by 1mg TWICE DAILY (Patient not taking: Reported on 2024), Disp: 57 tablet, Rfl: 0    sildenafil (REVATIO) 20 MG tablet, Take  one  to  5   before intecourse, Disp: 30 tablet, Rfl: 12  No Known Allergies  Social History     Socioeconomic History    Marital status: Single     Spouse name: Not on file    Number of children: Not on file    Years of education: Not on file    Highest education level: Not on file   Occupational History    Not on file   Tobacco Use    Smoking status: Former     Current packs/day: 0.50     Types: Cigarettes    Smokeless tobacco: Never   Vaping Use    Vaping Use: Never used   Substance and Sexual Activity    Alcohol use: Not Currently     Comment: SOCIAL    Drug use: Not Currently    Sexual activity: Not on

## 2024-04-15 ENCOUNTER — OFFICE VISIT (OUTPATIENT)
Dept: CHIROPRACTIC MEDICINE | Age: 49
End: 2024-04-15
Payer: COMMERCIAL

## 2024-04-15 VITALS
WEIGHT: 230.16 LBS | SYSTOLIC BLOOD PRESSURE: 130 MMHG | BODY MASS INDEX: 29.54 KG/M2 | OXYGEN SATURATION: 96 % | HEIGHT: 74 IN | DIASTOLIC BLOOD PRESSURE: 78 MMHG | HEART RATE: 94 BPM | TEMPERATURE: 98.1 F

## 2024-04-15 DIAGNOSIS — M54.50 ACUTE RIGHT-SIDED LOW BACK PAIN WITHOUT SCIATICA: ICD-10-CM

## 2024-04-15 DIAGNOSIS — S33.9XXA SPRAIN AND STRAIN OF LUMBOSACRAL JOINT/LIGAMENT, INITIAL ENCOUNTER: Primary | ICD-10-CM

## 2024-04-15 PROCEDURE — 97014 ELECTRIC STIMULATION THERAPY: CPT | Performed by: CHIROPRACTOR

## 2024-04-15 PROCEDURE — 3078F DIAST BP <80 MM HG: CPT | Performed by: CHIROPRACTOR

## 2024-04-15 PROCEDURE — 99203 OFFICE O/P NEW LOW 30 MIN: CPT | Performed by: CHIROPRACTOR

## 2024-04-15 PROCEDURE — 98940 CHIROPRACT MANJ 1-2 REGIONS: CPT | Performed by: CHIROPRACTOR

## 2024-04-15 PROCEDURE — 3075F SYST BP GE 130 - 139MM HG: CPT | Performed by: CHIROPRACTOR

## 2024-04-15 NOTE — PROGRESS NOTES
Patient is here for lower back pain. Patient states onset 4/11/2024. Patient states patient was untangling his dog and increased pain occurred. Ferdinand Singh DO  Electronically signed by Izzy Mayen LPN on 4/15/2024 at 2:39 PM

## 2024-04-15 NOTE — PROGRESS NOTES
4/15/24  Papito Alexandra : 1975 Sex: male  Age: 48 y.o.    Chief Complaint   Patient presents with    Back Pain       HPI:   Back Pain  Patient presents for evaluation of lower back pain. I last saw Papito Alexandra almost 4 years ago.     pm - dog was tangled up-he bent forward to untangle the leash and when he did so the dog took off and pulled him.  He had immediate low back pain when this occurred.  Pain was worse the next morning upon awakening and was bad for the next couple of days.  Gradually improving however  He has been using heat, ice ibuprofen for relief  His pain is better with standing and moving  Worse sitting still, cough, sneeze, and transitions.   R>L LBP without leg pains  3-4/10  No Bowel or bladder loss of control       Current Outpatient Medications:     Multiple Vitamins-Minerals (THERAPEUTIC MULTIVITAMIN-MINERALS) tablet, Take 1 tablet by mouth daily, Disp: , Rfl:     sildenafil (REVATIO) 20 MG tablet, Take  one  to  5   before intecourse, Disp: 30 tablet, Rfl: 12    varenicline (CHANTIX) 1 MG tablet, Take 1 tablet by mouth 2 times daily (Patient not taking: Reported on 2024), Disp: 180 tablet, Rfl: 1    varenicline (CHANTIX) 0.5 MG tablet, Take 1-2 tablets by mouth See Admin Instructions 0.5mg DAILY for 3 days followed by 0.5mg TWICE DAILY for 4 days followed by 1mg TWICE DAILY (Patient not taking: Reported on 2024), Disp: 57 tablet, Rfl: 0    Exam:   Vitals:    04/15/24 1437   BP: 130/78   Pulse: 94   Temp: 98.1 °F (36.7 °C)   SpO2: 96%     He appears well per no apparent distress.  Alert oriented x 3.  He stands unassisted.  Mild rightward antalgia is noted.  Lumbar flexion is 75% of normal with increased low back pain.  Extension 50% of normal with increased low back pain.  Lateral flexion is 50% of normal left, 75% of normal right all with lower back pain.    Reflexes are +2 and symmetrical at the knees and ankles.  Sensation to light touch is WNL to the distal lower

## 2024-04-18 ENCOUNTER — OFFICE VISIT (OUTPATIENT)
Dept: CHIROPRACTIC MEDICINE | Age: 49
End: 2024-04-18

## 2024-04-18 VITALS
TEMPERATURE: 97.7 F | HEIGHT: 74 IN | HEART RATE: 75 BPM | BODY MASS INDEX: 29.52 KG/M2 | OXYGEN SATURATION: 98 % | WEIGHT: 230 LBS

## 2024-04-18 DIAGNOSIS — M54.50 ACUTE RIGHT-SIDED LOW BACK PAIN WITHOUT SCIATICA: ICD-10-CM

## 2024-04-18 DIAGNOSIS — S33.9XXA SPRAIN AND STRAIN OF LUMBOSACRAL JOINT/LIGAMENT, INITIAL ENCOUNTER: Primary | ICD-10-CM

## 2024-04-18 NOTE — PROGRESS NOTES
24  Papito Alexandra : 1975 Sex: male  Age: 48 y.o.    Chief Complaint   Patient presents with    Lower Back Pain       HPI:   Pain has slightly improved.  He is not getting the twinges or quick shots of pain any longer but he still has tenderness in the lower back.  He has tried his exercises-nothing is really provoked him.  On average, pain is perceived as mild (2-3/10 pain scale). Patient denies new numbness, new weakness, new tingling      Current Outpatient Medications:     Multiple Vitamins-Minerals (THERAPEUTIC MULTIVITAMIN-MINERALS) tablet, Take 1 tablet by mouth daily, Disp: , Rfl:     sildenafil (REVATIO) 20 MG tablet, Take  one  to  5   before intecourse, Disp: 30 tablet, Rfl: 12    varenicline (CHANTIX) 1 MG tablet, Take 1 tablet by mouth 2 times daily (Patient not taking: Reported on 2024), Disp: 180 tablet, Rfl: 1    varenicline (CHANTIX) 0.5 MG tablet, Take 1-2 tablets by mouth See Admin Instructions 0.5mg DAILY for 3 days followed by 0.5mg TWICE DAILY for 4 days followed by 1mg TWICE DAILY (Patient not taking: Reported on 2024), Disp: 57 tablet, Rfl: 0    Exam:   Vitals:    24 1426   Pulse: 75   Temp: 97.7 °F (36.5 °C)   SpO2: 98%       There are hypertonic and tender fibers noted with palpation in the paraspinal muscles of the lumbar region. Joint fixation is noted with motion screening at L3-4, bilateral SI joints.    Papito was seen today for lower back pain.    Diagnoses and all orders for this visit:    Sprain and strain of lumbosacral joint/ligament, initial encounter    Acute right-sided low back pain without sciatica        Treatment Plan:  EMS with heat to the lumbar region for 15 minutes to address muscle spasm/hypertension and alleviate pain.  Diversified manipulation to the above-listed segments in the lumbar spine and SI jts .    Continue with HEP and self-care.  We discussed poking into pain, but not completely aggravating it.  I will see him back next week for

## 2024-04-18 NOTE — PROGRESS NOTES
Patient is here for follow up lower back pain. Patient describes pain as tender. Ferdinand Singh DO  Electronically signed by Izzy Mayen LPN on 4/18/2024 at 2:27 PM

## 2024-04-23 ENCOUNTER — OFFICE VISIT (OUTPATIENT)
Dept: CHIROPRACTIC MEDICINE | Age: 49
End: 2024-04-23
Payer: COMMERCIAL

## 2024-04-23 VITALS
TEMPERATURE: 97.8 F | BODY MASS INDEX: 29.52 KG/M2 | HEIGHT: 74 IN | HEART RATE: 77 BPM | WEIGHT: 230 LBS | OXYGEN SATURATION: 96 %

## 2024-04-23 DIAGNOSIS — S33.9XXA SPRAIN AND STRAIN OF LUMBOSACRAL JOINT/LIGAMENT, INITIAL ENCOUNTER: Primary | ICD-10-CM

## 2024-04-23 DIAGNOSIS — M54.50 ACUTE RIGHT-SIDED LOW BACK PAIN WITHOUT SCIATICA: ICD-10-CM

## 2024-04-23 PROCEDURE — 98940 CHIROPRACT MANJ 1-2 REGIONS: CPT | Performed by: CHIROPRACTOR

## 2024-04-23 PROCEDURE — 99999 PR OFFICE/OUTPT VISIT,PROCEDURE ONLY: CPT | Performed by: CHIROPRACTOR

## 2024-04-23 PROCEDURE — 97014 ELECTRIC STIMULATION THERAPY: CPT | Performed by: CHIROPRACTOR

## 2024-04-23 NOTE — PROGRESS NOTES
Patient is here for follow up lower back pain. Patient states improvement. Ferdinand Singh DO  Electronically signed by Izzy Mayen LPN on 4/23/2024 at 10:32 AM    
DC

## 2024-09-13 DIAGNOSIS — R73.01 IMPAIRED FASTING GLUCOSE: ICD-10-CM

## 2024-09-13 DIAGNOSIS — E55.9 VITAMIN D DEFICIENCY: ICD-10-CM

## 2024-09-13 DIAGNOSIS — I10 ESSENTIAL HYPERTENSION: ICD-10-CM

## 2024-09-13 DIAGNOSIS — E78.2 MIXED HYPERLIPIDEMIA: ICD-10-CM

## 2024-09-13 DIAGNOSIS — Z00.01 ENCOUNTER FOR ANNUAL GENERAL MEDICAL EXAMINATION WITH ABNORMAL FINDINGS IN ADULT: ICD-10-CM

## 2024-09-13 LAB
ALBUMIN: 4.2 G/DL (ref 3.5–5.2)
ALP BLD-CCNC: 68 U/L (ref 40–129)
ALT SERPL-CCNC: 36 U/L (ref 0–40)
ANION GAP SERPL CALCULATED.3IONS-SCNC: 10 MMOL/L (ref 7–16)
AST SERPL-CCNC: 24 U/L (ref 0–39)
BASOPHILS ABSOLUTE: 0.09 K/UL (ref 0–0.2)
BASOPHILS RELATIVE PERCENT: 1 % (ref 0–2)
BILIRUB SERPL-MCNC: 0.3 MG/DL (ref 0–1.2)
BILIRUBIN, URINE: NEGATIVE
BUN BLDV-MCNC: 10 MG/DL (ref 6–20)
CALCIUM SERPL-MCNC: 9 MG/DL (ref 8.6–10.2)
CHLORIDE BLD-SCNC: 106 MMOL/L (ref 98–107)
CHOLESTEROL, TOTAL: 178 MG/DL
CO2: 22 MMOL/L (ref 22–29)
COLOR, UA: YELLOW
COMMENT: NORMAL
CREAT SERPL-MCNC: 1.2 MG/DL (ref 0.7–1.2)
EOSINOPHILS ABSOLUTE: 0.73 K/UL (ref 0.05–0.5)
EOSINOPHILS RELATIVE PERCENT: 10 % (ref 0–6)
GFR, ESTIMATED: 77 ML/MIN/1.73M2
GLUCOSE BLD-MCNC: 102 MG/DL (ref 74–99)
GLUCOSE URINE: NEGATIVE MG/DL
HBA1C MFR BLD: 5.7 % (ref 4–5.6)
HCT VFR BLD CALC: 46.8 % (ref 37–54)
HDLC SERPL-MCNC: 44 MG/DL
HEMOGLOBIN: 15.7 G/DL (ref 12.5–16.5)
IMMATURE GRANULOCYTES %: 0 % (ref 0–5)
IMMATURE GRANULOCYTES ABSOLUTE: 0.03 K/UL (ref 0–0.58)
KETONES, URINE: NEGATIVE MG/DL
LDL CHOLESTEROL: 116 MG/DL
LEUKOCYTE ESTERASE, URINE: NEGATIVE
LYMPHOCYTES ABSOLUTE: 3.04 K/UL (ref 1.5–4)
LYMPHOCYTES RELATIVE PERCENT: 40 % (ref 20–42)
MCH RBC QN AUTO: 32.1 PG (ref 26–35)
MCHC RBC AUTO-ENTMCNC: 33.5 G/DL (ref 32–34.5)
MCV RBC AUTO: 95.7 FL (ref 80–99.9)
MONOCYTES ABSOLUTE: 0.4 K/UL (ref 0.1–0.95)
MONOCYTES RELATIVE PERCENT: 5 % (ref 2–12)
NEUTROPHILS ABSOLUTE: 3.37 K/UL (ref 1.8–7.3)
NEUTROPHILS RELATIVE PERCENT: 44 % (ref 43–80)
NITRITE, URINE: NEGATIVE
PDW BLD-RTO: 12.4 % (ref 11.5–15)
PH, URINE: 6 (ref 5–9)
PLATELET # BLD: 288 K/UL (ref 130–450)
PMV BLD AUTO: 9.5 FL (ref 7–12)
POTASSIUM SERPL-SCNC: 4.7 MMOL/L (ref 3.5–5)
PROSTATE SPECIFIC ANTIGEN: 1.08 NG/ML (ref 0–4)
PROTEIN UA: NEGATIVE MG/DL
RBC # BLD: 4.89 M/UL (ref 3.8–5.8)
SODIUM BLD-SCNC: 138 MMOL/L (ref 132–146)
SPECIFIC GRAVITY UA: 1.02 (ref 1–1.03)
TOTAL PROTEIN: 6.6 G/DL (ref 6.4–8.3)
TRIGL SERPL-MCNC: 89 MG/DL
TURBIDITY: CLEAR
URINE HGB: NEGATIVE
UROBILINOGEN, URINE: 0.2 EU/DL (ref 0–1)
VITAMIN D 25-HYDROXY: 23.9 NG/ML (ref 30–100)
VLDLC SERPL CALC-MCNC: 18 MG/DL
WBC # BLD: 7.7 K/UL (ref 4.5–11.5)

## 2024-09-18 ENCOUNTER — OFFICE VISIT (OUTPATIENT)
Dept: PRIMARY CARE CLINIC | Age: 49
End: 2024-09-18
Payer: COMMERCIAL

## 2024-09-18 DIAGNOSIS — R73.03 PRE-DIABETES: ICD-10-CM

## 2024-09-18 DIAGNOSIS — E78.2 MIXED HYPERLIPIDEMIA: ICD-10-CM

## 2024-09-18 DIAGNOSIS — N52.8 OTHER MALE ERECTILE DYSFUNCTION: ICD-10-CM

## 2024-09-18 DIAGNOSIS — F17.210 CIGARETTE NICOTINE DEPENDENCE WITHOUT COMPLICATION: ICD-10-CM

## 2024-09-18 DIAGNOSIS — Z00.01 ENCOUNTER FOR ANNUAL GENERAL MEDICAL EXAMINATION WITH ABNORMAL FINDINGS IN ADULT: Primary | ICD-10-CM

## 2024-09-18 PROCEDURE — 3079F DIAST BP 80-89 MM HG: CPT | Performed by: FAMILY MEDICINE

## 2024-09-18 PROCEDURE — 3075F SYST BP GE 130 - 139MM HG: CPT | Performed by: FAMILY MEDICINE

## 2024-09-18 PROCEDURE — 99396 PREV VISIT EST AGE 40-64: CPT | Performed by: FAMILY MEDICINE

## 2024-09-19 ENCOUNTER — TELEPHONE (OUTPATIENT)
Dept: PRIMARY CARE CLINIC | Age: 49
End: 2024-09-19

## 2024-09-19 VITALS
HEART RATE: 91 BPM | TEMPERATURE: 98.3 F | SYSTOLIC BLOOD PRESSURE: 135 MMHG | OXYGEN SATURATION: 98 % | HEIGHT: 74 IN | DIASTOLIC BLOOD PRESSURE: 82 MMHG | WEIGHT: 233 LBS | BODY MASS INDEX: 29.9 KG/M2

## 2024-09-19 PROBLEM — R73.03 PRE-DIABETES: Status: ACTIVE | Noted: 2024-09-19

## 2024-09-19 PROBLEM — F17.210 CIGARETTE NICOTINE DEPENDENCE WITHOUT COMPLICATION: Status: ACTIVE | Noted: 2024-09-19

## 2024-09-19 PROBLEM — N52.8 OTHER MALE ERECTILE DYSFUNCTION: Status: ACTIVE | Noted: 2024-09-19

## 2024-09-19 RX ORDER — VARENICLINE TARTRATE 0.5 MG/1
.5-1 TABLET, FILM COATED ORAL SEE ADMIN INSTRUCTIONS
Qty: 57 TABLET | Refills: 0 | Status: SHIPPED | OUTPATIENT
Start: 2024-09-19

## 2024-09-19 RX ORDER — VARENICLINE TARTRATE 1 MG/1
1 TABLET, FILM COATED ORAL 2 TIMES DAILY
Qty: 180 TABLET | Refills: 1 | Status: SHIPPED | OUTPATIENT
Start: 2024-09-19

## 2024-09-19 RX ORDER — SILDENAFIL CITRATE 20 MG/1
TABLET ORAL
Qty: 30 TABLET | Refills: 12 | Status: SHIPPED | OUTPATIENT
Start: 2024-09-19

## 2024-09-19 SDOH — ECONOMIC STABILITY: FOOD INSECURITY: WITHIN THE PAST 12 MONTHS, THE FOOD YOU BOUGHT JUST DIDN'T LAST AND YOU DIDN'T HAVE MONEY TO GET MORE.: NEVER TRUE

## 2024-09-19 SDOH — ECONOMIC STABILITY: FOOD INSECURITY: WITHIN THE PAST 12 MONTHS, YOU WORRIED THAT YOUR FOOD WOULD RUN OUT BEFORE YOU GOT MONEY TO BUY MORE.: NEVER TRUE

## 2024-09-19 SDOH — ECONOMIC STABILITY: INCOME INSECURITY: HOW HARD IS IT FOR YOU TO PAY FOR THE VERY BASICS LIKE FOOD, HOUSING, MEDICAL CARE, AND HEATING?: NOT HARD AT ALL

## 2024-09-19 ASSESSMENT — PATIENT HEALTH QUESTIONNAIRE - PHQ9
SUM OF ALL RESPONSES TO PHQ9 QUESTIONS 1 & 2: 0
1. LITTLE INTEREST OR PLEASURE IN DOING THINGS: NOT AT ALL
2. FEELING DOWN, DEPRESSED OR HOPELESS: NOT AT ALL
SUM OF ALL RESPONSES TO PHQ QUESTIONS 1-9: 0

## 2025-01-31 ENCOUNTER — HOSPITAL ENCOUNTER (EMERGENCY)
Age: 50
Discharge: HOME | End: 2025-01-31
Attending: EMERGENCY MEDICINE
Payer: COMMERCIAL

## 2025-01-31 ENCOUNTER — APPOINTMENT (OUTPATIENT)
Dept: GENERAL RADIOLOGY | Age: 50
End: 2025-01-31
Payer: COMMERCIAL

## 2025-01-31 VITALS
BODY MASS INDEX: 28.23 KG/M2 | HEART RATE: 70 BPM | TEMPERATURE: 97.5 F | HEIGHT: 74 IN | DIASTOLIC BLOOD PRESSURE: 91 MMHG | OXYGEN SATURATION: 100 % | SYSTOLIC BLOOD PRESSURE: 147 MMHG | WEIGHT: 220 LBS | RESPIRATION RATE: 16 BRPM

## 2025-01-31 DIAGNOSIS — S61.412A LACERATION OF LEFT HAND INVOLVING TENDON, INITIAL ENCOUNTER: Primary | ICD-10-CM

## 2025-01-31 DIAGNOSIS — Z23 NEED FOR TETANUS BOOSTER: ICD-10-CM

## 2025-01-31 DIAGNOSIS — S66.922A LACERATION OF LEFT HAND INVOLVING TENDON, INITIAL ENCOUNTER: Primary | ICD-10-CM

## 2025-01-31 PROCEDURE — 6360000002 HC RX W HCPCS: Performed by: NURSE PRACTITIONER

## 2025-01-31 PROCEDURE — 99284 EMERGENCY DEPT VISIT MOD MDM: CPT

## 2025-01-31 PROCEDURE — 96375 TX/PRO/DX INJ NEW DRUG ADDON: CPT

## 2025-01-31 PROCEDURE — 73130 X-RAY EXAM OF HAND: CPT

## 2025-01-31 PROCEDURE — 90714 TD VACC NO PRESV 7 YRS+ IM: CPT | Performed by: NURSE PRACTITIONER

## 2025-01-31 PROCEDURE — 6370000000 HC RX 637 (ALT 250 FOR IP): Performed by: NURSE PRACTITIONER

## 2025-01-31 PROCEDURE — 90471 IMMUNIZATION ADMIN: CPT | Performed by: NURSE PRACTITIONER

## 2025-01-31 PROCEDURE — 29125 APPL SHORT ARM SPLINT STATIC: CPT

## 2025-01-31 PROCEDURE — 2500000003 HC RX 250 WO HCPCS: Performed by: NURSE PRACTITIONER

## 2025-01-31 PROCEDURE — 96374 THER/PROPH/DIAG INJ IV PUSH: CPT

## 2025-01-31 PROCEDURE — 12002 RPR S/N/AX/GEN/TRNK2.6-7.5CM: CPT

## 2025-01-31 RX ORDER — LIDOCAINE HYDROCHLORIDE 10 MG/ML
5 INJECTION, SOLUTION INFILTRATION; PERINEURAL ONCE
Status: COMPLETED | OUTPATIENT
Start: 2025-01-31 | End: 2025-01-31

## 2025-01-31 RX ORDER — BACITRACIN ZINC 500 [USP'U]/G
OINTMENT TOPICAL
Status: DISCONTINUED
Start: 2025-01-31 | End: 2025-01-31 | Stop reason: HOSPADM

## 2025-01-31 RX ORDER — CEPHALEXIN 500 MG/1
500 CAPSULE ORAL 4 TIMES DAILY
Qty: 40 CAPSULE | Refills: 0 | Status: SHIPPED | OUTPATIENT
Start: 2025-01-31 | End: 2025-02-10

## 2025-01-31 RX ORDER — BACITRACIN ZINC 500 [USP'U]/G
OINTMENT TOPICAL ONCE
Status: COMPLETED | OUTPATIENT
Start: 2025-01-31 | End: 2025-01-31

## 2025-01-31 RX ORDER — KETOROLAC TROMETHAMINE 30 MG/ML
30 INJECTION, SOLUTION INTRAMUSCULAR; INTRAVENOUS ONCE
Status: COMPLETED | OUTPATIENT
Start: 2025-01-31 | End: 2025-01-31

## 2025-01-31 RX ADMIN — WATER 2000 MG: 1 INJECTION INTRAMUSCULAR; INTRAVENOUS; SUBCUTANEOUS at 11:10

## 2025-01-31 RX ADMIN — LIDOCAINE HYDROCHLORIDE 5 ML: 10 INJECTION, SOLUTION INFILTRATION; PERINEURAL at 12:35

## 2025-01-31 RX ADMIN — KETOROLAC TROMETHAMINE 30 MG: 30 INJECTION, SOLUTION INTRAMUSCULAR at 11:07

## 2025-01-31 RX ADMIN — CLOSTRIDIUM TETANI TOXOID ANTIGEN (FORMALDEHYDE INACTIVATED) AND CORYNEBACTERIUM DIPHTHERIAE TOXOID ANTIGEN (FORMALDEHYDE INACTIVATED) 0.5 ML: 5; 2 INJECTION, SUSPENSION INTRAMUSCULAR at 11:14

## 2025-01-31 RX ADMIN — BACITRACIN ZINC: 500 OINTMENT TOPICAL at 12:35

## 2025-01-31 ASSESSMENT — PAIN DESCRIPTION - LOCATION
LOCATION: HAND

## 2025-01-31 ASSESSMENT — PAIN SCALES - GENERAL
PAINLEVEL_OUTOF10: 2
PAINLEVEL_OUTOF10: 2
PAINLEVEL_OUTOF10: 3

## 2025-01-31 ASSESSMENT — PAIN DESCRIPTION - DESCRIPTORS: DESCRIPTORS: DISCOMFORT

## 2025-01-31 ASSESSMENT — PAIN - FUNCTIONAL ASSESSMENT
PAIN_FUNCTIONAL_ASSESSMENT: 0-10
PAIN_FUNCTIONAL_ASSESSMENT: PREVENTS OR INTERFERES SOME ACTIVE ACTIVITIES AND ADLS
PAIN_FUNCTIONAL_ASSESSMENT: 0-10

## 2025-01-31 ASSESSMENT — PAIN DESCRIPTION - ORIENTATION
ORIENTATION: LEFT

## 2025-01-31 NOTE — DISCHARGE INSTRUCTIONS
XR HAND LEFT (MIN 3 VIEWS)   Final Result   Skin irregularity along the lateral aspect of the 2nd metacarpophalangeal   joint. No radiopaque foreign body identified. No acute bony abnormality.

## 2025-01-31 NOTE — ED PROVIDER NOTES
Shared BLAZE-ED Attending Visit.  CC: No         [facility]  Department of Emergency Medicine   ED  Encounter Note  Admit Date/RoomTime: 2025  9:40 AM  ED Room: JOSEPH/JOSEPH    NAME: Papito Alexandra  : 1975  MRN: [id number]     Chief Complaint:  Laceration (Pt cut left hand on  )    History of Present Illness       Papito Alexandra is a 49 y.o. old male presenting to the emergency department by private vehicle, for a laceration to the left hand, caused by , which occurred at work approximately 2 hour(s) prior to arrival.  There is  a possibility of retained foreign body in the affected area.     Bleeding is  controlled. He takes no blood thinning agents.  There is pain at injury site.   Tetanus Status:  unknown.     ROS   Pertinent positives and negatives are stated within HPI, all other systems reviewed and are negative.    Past Medical History:  has a past medical history of Anxiety, Depression, and Inguinal hernia.    Surgical History:  has a past surgical history that includes Inguinal hernia repair (Bilateral, 2023) and Colonoscopy (N/A, 2024).    Social History:  reports that he has been smoking cigarettes. He has never used smokeless tobacco. He reports that he does not currently use alcohol. He reports that he does not currently use drugs.    Family History: family history includes Diabetes in his father; High Blood Pressure in his father.     Allergies: Patient has no known allergies.    Physical Exam  Physical Exam   Oxygen Saturation Interpretation: Normal.        ED Triage Vitals [25 0904]   BP Systolic BP Percentile Diastolic BP Percentile Temp Temp Source Pulse Respirations SpO2   (!) 146/95 -- -- 97.5 °F (36.4 °C) Oral 83 16 99 %      Height Weight - Scale         1.88 m (6' 2\") 99.8 kg (220 lb)               Constitutional:  Alert, development consistent with age.  Neck:  Normal ROM.  Supple. Non-tender.  Hand: Left second             Tenderness:

## 2025-02-17 ENCOUNTER — TELEPHONE (OUTPATIENT)
Dept: OCCUPATIONAL THERAPY | Age: 50
End: 2025-02-17

## 2025-02-19 ENCOUNTER — TELEPHONE (OUTPATIENT)
Dept: OCCUPATIONAL THERAPY | Age: 50
End: 2025-02-19

## 2025-02-19 NOTE — TELEPHONE ENCOUNTER
2ND ATTEMPT TO REACH FELICITA TO [facility] OCCUPATIONAL THERAPY. LM TO RETURN CALL

## 2025-02-27 ENCOUNTER — HOSPITAL ENCOUNTER (OUTPATIENT)
Dept: OCCUPATIONAL THERAPY | Age: 50
Setting detail: THERAPIES SERIES
Discharge: HOME | End: 2025-02-27
Payer: COMMERCIAL

## 2025-02-27 PROCEDURE — 97165 OT EVAL LOW COMPLEX 30 MIN: CPT | Performed by: OCCUPATIONAL THERAPIST

## 2025-02-27 PROCEDURE — 97110 THERAPEUTIC EXERCISES: CPT | Performed by: OCCUPATIONAL THERAPIST

## 2025-02-27 NOTE — PROGRESS NOTES
OCCUPATIONAL THERAPY INITIAL EVALUATION    [facility]  [facility]  [address] [facility] [address] [facility] [address]  Dept: [phone]  Loc: [phone]   [facility] OT Fax: [phone]    Date:  2025  Initial Evaluation Date: 2025   Evaluating Therapist: Samantha Montes OT    Patient Name:  Papito Alexandra    :  1975    Restrictions/Precautions:  Activities as tolerated, low fall risk  Diagnosis:  S56.122A (ICD-10-CM) - Laceration of flexor muscle, fascia and tendon of left index finger at forearm level, initial encounter   Date of Surgery/Injury: 2025    Insurance/Certification information:  [employer] CMS Claim#: [id number]  Plan of care signed (Y/N): N  Visit# / total visits:  V authorized until 3/31/25    Referring Practitioner:  Dr. Aidan Abdi,   Specific Practitioner Orders: Eval and treat    Assessment of current deficits   [x] IADLs  [x] Strength  [x] Pain  [x] Sensation        [x] Scar Adhesion/Skin Integrity   [x] Motor Endurance     OT PLAN OF CARE   OT POC based on physician orders, patient diagnosis and results of clinical assessment.    Frequency/Duration: 2-3x/wk for 12 visits  /  Certification Period From: 2025  To: 2025    Specific OT Treatment to include:   [x] Instruction in HEP                   Modalities:  [x] Therapeutic Exercise        [x] Ultrasound             [x] PROM/Stretching                    [x] Fluidotherapy          [x]  Paraffin                   [x] AAROM  [x] AROM                             [x] ADL/IADL re-training         [x] Desensitization/Nerve Stimulation     [x] Therapeutic Activity       [x] Pain Management with/without modalities PRN  [x] Manual Therapy                      [x] Scar Management                                [x] Manual Edema Mobilization                                          Patient Specific Goal: To improve  and strengthen lateral movements of the IF.                             GOALS (Long

## 2025-03-04 ENCOUNTER — HOSPITAL ENCOUNTER (OUTPATIENT)
Dept: OCCUPATIONAL THERAPY | Age: 50
Setting detail: THERAPIES SERIES
Discharge: HOME | End: 2025-03-04

## 2025-03-04 PROCEDURE — 97035 APP MDLTY 1+ULTRASOUND EA 15: CPT | Performed by: OCCUPATIONAL THERAPIST

## 2025-03-04 PROCEDURE — 97140 MANUAL THERAPY 1/> REGIONS: CPT | Performed by: OCCUPATIONAL THERAPIST

## 2025-03-04 PROCEDURE — 97110 THERAPEUTIC EXERCISES: CPT | Performed by: OCCUPATIONAL THERAPIST

## 2025-03-04 NOTE — PROGRESS NOTES
use of the L UE s/p sx intervention to improve IND and QOL : Treatment covered based on POC and graduated to patient's progress. Pt education continues at each visit to obtain maximum benefits from skilled OT intervention.  []  Alter Plan of care:   []  Discharge:    KVNG Scott/STEVENSON, MS #[id number]

## 2025-03-06 ENCOUNTER — HOSPITAL ENCOUNTER (OUTPATIENT)
Dept: OCCUPATIONAL THERAPY | Age: 50
Setting detail: THERAPIES SERIES
Discharge: HOME | End: 2025-03-06
Payer: COMMERCIAL

## 2025-03-06 PROCEDURE — 97035 APP MDLTY 1+ULTRASOUND EA 15: CPT | Performed by: OCCUPATIONAL THERAPIST

## 2025-03-06 PROCEDURE — 97140 MANUAL THERAPY 1/> REGIONS: CPT | Performed by: OCCUPATIONAL THERAPIST

## 2025-03-06 PROCEDURE — 97110 THERAPEUTIC EXERCISES: CPT | Performed by: OCCUPATIONAL THERAPIST

## 2025-03-06 NOTE — PROGRESS NOTES
OCCUPATIONAL THERAPY DAILY TREATMENT NOTE  [facility]  [facility]  [address] [facility] [address]  Dept: [phone]  Loc: [phone]   [facility] OT Fax: [phone]    Date:  3/6/2025   Initial Evaluation Date: 2025                            Evaluating Therapist: Samantha Montes OT     Patient Name:  Papito Alexandra                     :  1975     Restrictions/Precautions:  Activities as tolerated, low fall risk  Diagnosis:  S56.122A (ICD-10-CM) - Laceration of flexor muscle, fascia and tendon of left index finger at forearm level, initial encounter   Date of Surgery/Injury: 2025     Insurance/Certification information:  [employer] CMS Claim#: [id number]  Plan of care signed (Y/N): Y - signed electronically  Visit# / total visits: 3 / 12 V authorized until 3/31/25     Referring Practitioner:  Dr. Aidan Abdi,   Specific Practitioner Orders: Eval and treat     Assessment of current deficits   [x] IADLs         [x] Strength     [x] Pain            [x] Sensation         [x] Scar Adhesion/Skin Integrity   [x] Motor Endurance      OT PLAN OF CARE   OT POC based on physician orders, patient diagnosis and results of clinical assessment.     Frequency/Duration: 2-3x/wk for 12 visits  /  Certification Period From: 2025  To: 2025     Specific OT Treatment to include:   [x] Instruction in HEP                   Modalities:  [x] Therapeutic Exercise                 [x] Ultrasound             [x] PROM/Stretching                    [x] Fluidotherapy          [x]  Paraffin                   [x] AAROM  [x] AROM                             [x] ADL/IADL re-training                 [x] Desensitization/Nerve Stimulation     [x] Therapeutic Activity                  [x] Pain Management with/without modalities PRN  [x] Manual Therapy                      [x] Scar Management                                                    [x] Manual Edema Mobilization

## 2025-03-11 ENCOUNTER — HOSPITAL ENCOUNTER (OUTPATIENT)
Dept: OCCUPATIONAL THERAPY | Age: 50
Setting detail: THERAPIES SERIES
Discharge: HOME | End: 2025-03-11
Payer: COMMERCIAL

## 2025-03-11 NOTE — PROGRESS NOTES
Phone: [phone] Fax: [phone]    Occupational Therapy   Cancellation/No-show Note     Patient Name:  Papito Alexandra  : 1975  Date:  3/11/2025  MRN: [id number]    For today's appointment patient:       [x]  Cancelled   []  Rescheduled appointment   []  No-show     Reason given by patient:   [x]  Patient ill   []  Conflicting appointment   []  No transportation   []  Conflict with work   []  No reason given   []  Other:     Comments: Has the flu.     Electronically signed by: KVNG Scott/L, MS, #[id number]

## 2025-03-12 ENCOUNTER — OFFICE VISIT (OUTPATIENT)
Dept: FAMILY MEDICINE CLINIC | Age: 50
End: 2025-03-12

## 2025-03-12 VITALS
BODY MASS INDEX: 29.29 KG/M2 | TEMPERATURE: 97.3 F | WEIGHT: 228.2 LBS | HEIGHT: 74 IN | HEART RATE: 78 BPM | DIASTOLIC BLOOD PRESSURE: 74 MMHG | OXYGEN SATURATION: 97 % | SYSTOLIC BLOOD PRESSURE: 108 MMHG

## 2025-03-12 DIAGNOSIS — J40 SINOBRONCHITIS: ICD-10-CM

## 2025-03-12 DIAGNOSIS — R05.9 COUGH, UNSPECIFIED TYPE: Primary | ICD-10-CM

## 2025-03-12 DIAGNOSIS — J32.9 SINOBRONCHITIS: ICD-10-CM

## 2025-03-12 LAB
INFLUENZA A ANTIBODY: NEGATIVE
INFLUENZA B ANTIBODY: NEGATIVE
Lab: NORMAL
PERFORMING INSTRUMENT: NORMAL
QC PASS/FAIL: NORMAL
SARS-COV-2, POC: NORMAL

## 2025-03-12 RX ORDER — CEFDINIR 300 MG/1
300 CAPSULE ORAL 2 TIMES DAILY
Qty: 20 CAPSULE | Refills: 0 | Status: SHIPPED | OUTPATIENT
Start: 2025-03-12 | End: 2025-03-22

## 2025-03-12 ASSESSMENT — ENCOUNTER SYMPTOMS
EYE PAIN: 0
SORE THROAT: 1
SINUS PAIN: 0
NAUSEA: 0
BLOOD IN STOOL: 0
ALLERGIC/IMMUNOLOGIC NEGATIVE: 1
EYE DISCHARGE: 0
BACK PAIN: 0
CHEST TIGHTNESS: 0
SINUS PRESSURE: 1
ABDOMINAL PAIN: 0
PHOTOPHOBIA: 0
DIARRHEA: 0
SHORTNESS OF BREATH: 0
TROUBLE SWALLOWING: 0
EYE REDNESS: 0
VOMITING: 0
COUGH: 1

## 2025-03-12 NOTE — PROGRESS NOTES
3/12/25  Papito Alexandra : 1975 Sex: male  Age: 49 y.o.      Assessment and Plan:  Papito was seen today for cough, congestion and fatigue.    Diagnoses and all orders for this visit:    Cough, unspecified type  -     POCT COVID-19, Antigen  -     POCT Influenza A/B    Sinobronchitis  -     cefdinir (OMNICEF) 300 MG capsule; Take 1 capsule by mouth 2 times daily for 10 days    Rapid antigen testing for COVID and influenza were negative.  Will treat his sinobronchitis with 10 days of cefdinir twice daily.  Symptomatic treatment can include Tylenol, fluids, rest, Mucinex, Claritin, coolmist.  If complaints do not improve, or worsen in any way, present back to the office.    Return 3 to 5-day recheck if not improved.    Chief Complaint   Patient presents with    Cough    Congestion    Fatigue       Congestion, pressure, drainage, facial tenderness, mild headache, cough, fatigue, diaphoresis onset 3 days ago.  Denies fever, chills, diaphoresis, nausea, vomiting, decreased oral intake. Denies other GI or  complaints.   OTC treatments minimally effective.          Review of Systems   Constitutional:  Positive for chills, diaphoresis, fatigue and fever. Negative for appetite change and unexpected weight change.   HENT:  Positive for congestion, postnasal drip, sinus pressure and sore throat. Negative for ear pain, hearing loss, sinus pain and trouble swallowing.    Eyes:  Negative for photophobia, pain, discharge and redness.   Respiratory:  Positive for cough. Negative for chest tightness and shortness of breath.    Cardiovascular:  Negative for chest pain, palpitations and leg swelling.   Gastrointestinal:  Negative for abdominal pain, blood in stool, diarrhea, nausea and vomiting.   Endocrine: Negative.    Genitourinary:  Negative for dysuria, flank pain, frequency and hematuria.   Musculoskeletal:  Negative for arthralgias, back pain, joint swelling and myalgias.   Skin: Negative.    Allergic/Immunologic:

## 2025-03-13 ENCOUNTER — HOSPITAL ENCOUNTER (OUTPATIENT)
Dept: OCCUPATIONAL THERAPY | Age: 50
Setting detail: THERAPIES SERIES
Discharge: HOME | End: 2025-03-13
Payer: COMMERCIAL

## 2025-03-13 NOTE — PROGRESS NOTES
Phone: [phone] Fax: [phone]    Occupational Therapy   Cancellation/No-show Note     Patient Name:  Papito Alexandra  : 1975  Date:  3/13/2025  MRN: [id number]    For today's appointment patient:       [x]  Cancelled   []  Rescheduled appointment   []  No-show     Reason given by patient:   [x]  Patient ill   []  Conflicting appointment   []  No transportation   []  Conflict with work   []  No reason given   []  Other:     Comments: Continues to have flu like symptoms.    Electronically signed by: KVNG Scott/L, MS, #[id number]

## 2025-03-18 ENCOUNTER — HOSPITAL ENCOUNTER (OUTPATIENT)
Dept: OCCUPATIONAL THERAPY | Age: 50
Setting detail: THERAPIES SERIES
Discharge: HOME | End: 2025-03-18
Payer: COMMERCIAL

## 2025-03-18 PROCEDURE — 97140 MANUAL THERAPY 1/> REGIONS: CPT

## 2025-03-18 PROCEDURE — 97018 PARAFFIN BATH THERAPY: CPT

## 2025-03-18 PROCEDURE — 97110 THERAPEUTIC EXERCISES: CPT

## 2025-03-18 NOTE — PROGRESS NOTES
OCCUPATIONAL THERAPY DAILY TREATMENT NOTE  [facility]  [facility]  [address] [facility] [address]  Dept: [phone]  Loc: [phone]   [facility] Fax: [phone]    Date:  3/18/2025   Initial Evaluation Date: 2025                            Evaluating Therapist: Samantha Montes OT     Patient Name:  Papito Alexandra                     :  1975     Restrictions/Precautions:  Activities as tolerated, low fall risk  Diagnosis:  S56.122A (ICD-10-CM) - Laceration of flexor muscle, fascia and tendon of left index finger at forearm level, initial encounter   Date of Surgery/Injury: 2025     Insurance/Certification information:  [employer] CMS Claim#: [id number]  Plan of care signed (Y/N): Y - signed electronically  Visit# / total visits:  V authorized until 3/31/25     Referring Practitioner:  Dr. Aidan Abdi,   Specific Practitioner Orders: Eval and treat     Assessment of current deficits   [x] IADLs         [x] Strength     [x] Pain            [x] Sensation         [x] Scar Adhesion/Skin Integrity   [x] Motor Endurance      OT PLAN OF CARE   OT POC based on physician orders, patient diagnosis and results of clinical assessment.     Frequency/Duration: 2-3x/wk for 12 visits  /  Certification Period From: 2025  To: 2025     Specific OT Treatment to include:   [x] Instruction in HEP                   Modalities:  [x] Therapeutic Exercise                 [x] Ultrasound             [x] PROM/Stretching                    [x] Fluidotherapy          [x]  Paraffin                   [x] AAROM  [x] AROM                             [x] ADL/IADL re-training                 [x] Desensitization/Nerve Stimulation     [x] Therapeutic Activity                  [x] Pain Management with/without modalities PRN  [x] Manual Therapy                      [x] Scar Management                                                    [x] Manual Edema Mobilization

## 2025-03-20 ENCOUNTER — HOSPITAL ENCOUNTER (OUTPATIENT)
Dept: OCCUPATIONAL THERAPY | Age: 50
Setting detail: THERAPIES SERIES
Discharge: HOME | End: 2025-03-20
Payer: COMMERCIAL

## 2025-03-20 DIAGNOSIS — Z00.01 ENCOUNTER FOR ANNUAL GENERAL MEDICAL EXAMINATION WITH ABNORMAL FINDINGS IN ADULT: ICD-10-CM

## 2025-03-20 DIAGNOSIS — E78.2 MIXED HYPERLIPIDEMIA: ICD-10-CM

## 2025-03-20 DIAGNOSIS — R73.03 PRE-DIABETES: ICD-10-CM

## 2025-03-20 LAB — HBA1C MFR BLD: 6 % (ref 4–5.6)

## 2025-03-20 PROCEDURE — 97140 MANUAL THERAPY 1/> REGIONS: CPT

## 2025-03-20 PROCEDURE — 97110 THERAPEUTIC EXERCISES: CPT

## 2025-03-20 PROCEDURE — 97018 PARAFFIN BATH THERAPY: CPT

## 2025-03-20 NOTE — PROGRESS NOTES
Demonstrates/verbalizes understanding of HEP/Ed previously given.      Time In: 3:00            Time Out: 3:55                        CODE  Minutes Time In-Out  Units   80786 Fluidotherapy      71595 Manual 15 315- 330 1   10557 Therapeutic Ex 25 330- 355 2   19529 Therapeutic Activity      55962 ADL/COMP Tech Train      93627 Neuromuscular Re-Ed      26157 Ortho Management Training      94130 Paraffin 15 3- 315 1   54650 Electrical Stim - Attended      88363 Iontophoresis      40267 Ultrasound       Other        55  4     Plan: OT 2-3x/week for 12 sessions    [x]  Continues Plan of care with focus on pain/edema/scar management, sensation, activity tolerance, strength, and overall increased functional use of the L UE s/p sx intervention to improve IND and QOL : Treatment covered based on POC and graduated to patient's progress. Pt education continues at each visit to obtain maximum benefits from skilled OT intervention.  []  Alter Plan of care:   []  Discharge:    Keke Baker OTR/STEVENSON [id number]

## 2025-03-21 ENCOUNTER — OFFICE VISIT (OUTPATIENT)
Dept: PRIMARY CARE CLINIC | Age: 50
End: 2025-03-21

## 2025-03-21 VITALS
TEMPERATURE: 97.6 F | BODY MASS INDEX: 29.79 KG/M2 | WEIGHT: 232 LBS | RESPIRATION RATE: 18 BRPM | OXYGEN SATURATION: 96 % | HEART RATE: 77 BPM | DIASTOLIC BLOOD PRESSURE: 82 MMHG | SYSTOLIC BLOOD PRESSURE: 135 MMHG

## 2025-03-21 DIAGNOSIS — E11.9 DIET-CONTROLLED DIABETES MELLITUS (HCC): Primary | ICD-10-CM

## 2025-03-21 DIAGNOSIS — E78.2 MIXED HYPERLIPIDEMIA: Chronic | ICD-10-CM

## 2025-03-21 DIAGNOSIS — N52.8 OTHER MALE ERECTILE DYSFUNCTION: ICD-10-CM

## 2025-03-21 DIAGNOSIS — R79.89 ELEVATED LIVER FUNCTION TESTS: ICD-10-CM

## 2025-03-21 DIAGNOSIS — Z00.01 ENCOUNTER FOR ANNUAL GENERAL MEDICAL EXAMINATION WITH ABNORMAL FINDINGS IN ADULT: ICD-10-CM

## 2025-03-21 DIAGNOSIS — F17.210 CIGARETTE NICOTINE DEPENDENCE WITHOUT COMPLICATION: ICD-10-CM

## 2025-03-21 PROBLEM — Z20.822 EXPOSURE TO COVID-19 VIRUS: Status: RESOLVED | Noted: 2021-02-17 | Resolved: 2025-03-21

## 2025-03-21 LAB
ALBUMIN: 4.1 G/DL (ref 3.5–5.2)
ALP BLD-CCNC: 90 U/L (ref 40–129)
ALT SERPL-CCNC: 45 U/L (ref 0–40)
ANION GAP SERPL CALCULATED.3IONS-SCNC: 15 MMOL/L (ref 7–16)
AST SERPL-CCNC: 31 U/L (ref 0–39)
BILIRUB SERPL-MCNC: 0.2 MG/DL (ref 0–1.2)
BUN BLDV-MCNC: 16 MG/DL (ref 6–20)
CALCIUM SERPL-MCNC: 9.4 MG/DL (ref 8.6–10.2)
CHLORIDE BLD-SCNC: 105 MMOL/L (ref 98–107)
CHOLESTEROL, TOTAL: 190 MG/DL
CO2: 19 MMOL/L (ref 22–29)
CREAT SERPL-MCNC: 1.1 MG/DL (ref 0.7–1.2)
GFR, ESTIMATED: 82 ML/MIN/1.73M2
GLUCOSE BLD-MCNC: 90 MG/DL (ref 74–99)
HDLC SERPL-MCNC: 36 MG/DL
LDL CHOLESTEROL: ABNORMAL MG/DL
POTASSIUM SERPL-SCNC: 4.7 MMOL/L (ref 3.5–5)
SODIUM BLD-SCNC: 139 MMOL/L (ref 132–146)
TOTAL PROTEIN: 6.9 G/DL (ref 6.4–8.3)
TRIGL SERPL-MCNC: 428 MG/DL
VLDLC SERPL CALC-MCNC: ABNORMAL MG/DL

## 2025-03-21 RX ORDER — SILDENAFIL CITRATE 20 MG/1
TABLET ORAL
Qty: 30 TABLET | Refills: 12 | Status: SHIPPED | OUTPATIENT
Start: 2025-03-21

## 2025-03-21 SDOH — ECONOMIC STABILITY: FOOD INSECURITY: WITHIN THE PAST 12 MONTHS, YOU WORRIED THAT YOUR FOOD WOULD RUN OUT BEFORE YOU GOT MONEY TO BUY MORE.: NEVER TRUE

## 2025-03-21 SDOH — ECONOMIC STABILITY: FOOD INSECURITY: WITHIN THE PAST 12 MONTHS, THE FOOD YOU BOUGHT JUST DIDN'T LAST AND YOU DIDN'T HAVE MONEY TO GET MORE.: NEVER TRUE

## 2025-03-21 ASSESSMENT — PATIENT HEALTH QUESTIONNAIRE - PHQ9
SUM OF ALL RESPONSES TO PHQ QUESTIONS 1-9: 0
1. LITTLE INTEREST OR PLEASURE IN DOING THINGS: NOT AT ALL
2. FEELING DOWN, DEPRESSED OR HOPELESS: NOT AT ALL

## 2025-03-21 NOTE — PROGRESS NOTES
care.      Educational materials and/or home exercises printed for patient's review and were included in patient instructions on his/her After Visit Summary and given to patient at the end of visit.      Counseled regarding above diagnosis, including possible risks and complications,  especially if left uncontrolled.    Counseled regarding the possible side effects, risks, benefits and alternatives to treatment; patient and/or guardian verbalizes understanding, agrees, feels comfortable with and wishes to proceed with above treatment plan.    Advised patient to call with any new medication issues, and read all Rx info from pharmacy to assure aware of all possible risks and side effects of medication before taking.    Reviewed age and gender appropriate health screening exams and vaccinations.  Advised patient regarding importance of keeping up with recommended health maintenance and to schedule as soon as possible if overdue, as this is important in assessing for undiagnosed pathology, especially cancer, as well as protecting against potentially harmful/life threatening disease.        Patient and/or guardian verbalizes understanding and agrees with above counseling, assessment and plan.    All questions answered.      I have personally reviewed and updated the chief complaint, HPI, Past Medical, Family and Social History, as well as the above Review of Systems.     The patient (or guardian, if applicable) and other individuals in attendance with the patient were advised that Artificial Intelligence will be utilized during this visit to record, process the conversation to generate a clinical note and to support improvement of the AI technology. The patient (or guardian, if applicable) and other individuals in attendance at the appointment consented to the use of AI, including the recording.      An electronic signature was used to authenticate this note.    --Ferdinand Singh DO

## 2025-03-25 ENCOUNTER — HOSPITAL ENCOUNTER (OUTPATIENT)
Dept: OCCUPATIONAL THERAPY | Age: 50
Setting detail: THERAPIES SERIES
Discharge: HOME | End: 2025-03-25
Payer: COMMERCIAL

## 2025-03-25 PROCEDURE — 97035 APP MDLTY 1+ULTRASOUND EA 15: CPT

## 2025-03-25 PROCEDURE — 97110 THERAPEUTIC EXERCISES: CPT

## 2025-03-25 PROCEDURE — 97018 PARAFFIN BATH THERAPY: CPT

## 2025-03-25 PROCEDURE — 97140 MANUAL THERAPY 1/> REGIONS: CPT

## 2025-03-25 NOTE — PROGRESS NOTES
OCCUPATIONAL THERAPY DAILY TREATMENT NOTE  Y [facility]  [facility]  [address] [facility] [address]  Dept: [phone]  Loc: [phone]   [facility] OT Fax: [phone]    Date:  3/25/2025   Initial Evaluation Date: 2025                            Evaluating Therapist: Samantha Montes OT     Patient Name:  Papito Alexandra                     :  1975     Restrictions/Precautions:  Activities as tolerated, low fall risk  Diagnosis:  S56.122A (ICD-10-CM) - Laceration of flexor muscle, fascia and tendon of left index finger at forearm level, initial encounter   Date of Surgery/Injury: 2025     Insurance/Certification information:  [employer] CMS Claim#: [id number]  Plan of care signed (Y/N): Y - signed electronically  Visit# / total visits:  V authorized until 3/31/25     Referring Practitioner:  Dr. Aidan Abdi,   Specific Practitioner Orders: Eval and treat     Assessment of current deficits   [x] IADLs         [x] Strength     [x] Pain            [x] Sensation         [x] Scar Adhesion/Skin Integrity   [x] Motor Endurance      OT PLAN OF CARE   OT POC based on physician orders, patient diagnosis and results of clinical assessment.     Frequency/Duration: 2-3x/wk for 12 visits  /  Certification Period From: 2025  To: 2025     Specific OT Treatment to include:   [x] Instruction in HEP                   Modalities:  [x] Therapeutic Exercise                 [x] Ultrasound             [x] PROM/Stretching                    [x] Fluidotherapy          [x]  Paraffin                   [x] AAROM  [x] AROM                             [x] ADL/IADL re-training                 [x] Desensitization/Nerve Stimulation     [x] Therapeutic Activity                  [x] Pain Management with/without modalities PRN  [x] Manual Therapy                      [x] Scar Management                                                    [x] Manual Edema Mobilization

## 2025-03-27 ENCOUNTER — HOSPITAL ENCOUNTER (OUTPATIENT)
Dept: OCCUPATIONAL THERAPY | Age: 50
Setting detail: THERAPIES SERIES
Discharge: HOME | End: 2025-03-27
Payer: COMMERCIAL

## 2025-03-27 PROCEDURE — 97110 THERAPEUTIC EXERCISES: CPT | Performed by: OCCUPATIONAL THERAPIST

## 2025-03-27 PROCEDURE — 97140 MANUAL THERAPY 1/> REGIONS: CPT | Performed by: OCCUPATIONAL THERAPIST

## 2025-03-27 PROCEDURE — 97018 PARAFFIN BATH THERAPY: CPT | Performed by: OCCUPATIONAL THERAPIST

## 2025-03-27 NOTE — PROGRESS NOTES
OCCUPATIONAL THERAPY DAILY TREATMENT NOTE  [facility]  [facility]  [address] [facility] [address]  Dept: [phone]  Loc: [phone]   [facility] OT Fax: [phone]    Date:  3/27/2025   Initial Evaluation Date: 2025                            Evaluating Therapist: Samantha Montes OT     Patient Name:  Papito Alexandra                     :  1975     Restrictions/Precautions:  Activities as tolerated, low fall risk  Diagnosis:  S56.122A (ICD-10-CM) - Laceration of flexor muscle, fascia and tendon of left index finger at forearm level, initial encounter   Date of Surgery/Injury: 2025     Insurance/Certification information:  [employer] CMS Claim#: [id number]  Plan of care signed (Y/N): Y - signed electronically  Visit# / total visits:  V authorized until 3/31/25 --> Extended to      Referring Practitioner:  Dr. Aidan Abdi, DO  Specific Practitioner Orders: Eval and treat     Assessment of current deficits   [x] IADLs         [x] Strength     [x] Pain            [x] Sensation         [x] Scar Adhesion/Skin Integrity   [x] Motor Endurance      OT PLAN OF CARE   OT POC based on physician orders, patient diagnosis and results of clinical assessment.     Frequency/Duration: 2-3x/wk for 12 visits  /  Certification Period From: 2025  To: 2025     Specific OT Treatment to include:   [x] Instruction in HEP                   Modalities:  [x] Therapeutic Exercise                 [x] Ultrasound             [x] PROM/Stretching                    [x] Fluidotherapy          [x]  Paraffin                   [x] AAROM  [x] AROM                             [x] ADL/IADL re-training                 [x] Desensitization/Nerve Stimulation     [x] Therapeutic Activity                  [x] Pain Management with/without modalities PRN  [x] Manual Therapy                      [x] Scar Management                                                    [x] Manual Edema

## 2025-04-01 ENCOUNTER — HOSPITAL ENCOUNTER (OUTPATIENT)
Dept: OCCUPATIONAL THERAPY | Age: 50
Setting detail: THERAPIES SERIES
Discharge: HOME | End: 2025-04-01
Payer: COMMERCIAL

## 2025-04-01 PROCEDURE — 97110 THERAPEUTIC EXERCISES: CPT

## 2025-04-01 PROCEDURE — 97018 PARAFFIN BATH THERAPY: CPT

## 2025-04-01 PROCEDURE — 97140 MANUAL THERAPY 1/> REGIONS: CPT

## 2025-04-01 PROCEDURE — 97530 THERAPEUTIC ACTIVITIES: CPT

## 2025-04-01 NOTE — PROGRESS NOTES
OCCUPATIONAL THERAPY DAILY PROGRESS UPDATE  Y [facility]  [facility]  [address] [facility] [address]  Dept: [phone]  Loc: [phone]   [facility] OT Fax: [phone]    Date:  2025   Initial Evaluation Date: 2025                            Evaluating Therapist: Samantha Montes OT     Patient Name:  Papito Alexandra                     :  1975     Restrictions/Precautions:  Activities as tolerated, low fall risk  Diagnosis:  S56.122A (ICD-10-CM) - Laceration of flexor muscle, fascia and tendon of left index finger at forearm level, initial encounter   Date of Surgery/Injury: 2025     Insurance/Certification information:  [employer] CMS Claim#: [id number]  Plan of care signed (Y/N): Y - signed electronically  Visit# / total visits:  V authorized until 3/31/25 --> Extended to      Referring Practitioner:  Dr. Aidan Abdi, DO  Specific Practitioner Orders: Eval and treat     Assessment of current deficits   [x] IADLs         [x] Strength     [x] Pain            [x] Sensation         [x] Scar Adhesion/Skin Integrity   [x] Motor Endurance      OT PLAN OF CARE   OT POC based on physician orders, patient diagnosis and results of clinical assessment.     Frequency/Duration: 2-3x/wk for 12 visits  /  Certification Period From: 2025  To: 2025     Specific OT Treatment to include:   [x] Instruction in HEP                   Modalities:  [x] Therapeutic Exercise                 [x] Ultrasound             [x] PROM/Stretching                    [x] Fluidotherapy          [x]  Paraffin                   [x] AAROM  [x] AROM                             [x] ADL/IADL re-training                 [x] Desensitization/Nerve Stimulation     [x] Therapeutic Activity                  [x] Pain Management with/without modalities PRN  [x] Manual Therapy                      [x] Scar Management                                                    [x] Manual Edema

## 2025-04-03 ENCOUNTER — HOSPITAL ENCOUNTER (OUTPATIENT)
Dept: OCCUPATIONAL THERAPY | Age: 50
Setting detail: THERAPIES SERIES
Discharge: HOME | End: 2025-04-03
Payer: COMMERCIAL

## 2025-04-03 PROCEDURE — 97022 WHIRLPOOL THERAPY: CPT | Performed by: OCCUPATIONAL THERAPIST

## 2025-04-03 PROCEDURE — 97140 MANUAL THERAPY 1/> REGIONS: CPT | Performed by: OCCUPATIONAL THERAPIST

## 2025-04-03 PROCEDURE — 97110 THERAPEUTIC EXERCISES: CPT | Performed by: OCCUPATIONAL THERAPIST

## 2025-04-03 NOTE — PROGRESS NOTES
Treatment covered based on POC and graduated to patient's progress. Pt education continues at each visit to obtain maximum benefits from skilled OT intervention.  []  Alter Plan of care:   []  Discharge:    Samantha Montes MS, OTR/L #[id number]

## 2025-04-08 ENCOUNTER — HOSPITAL ENCOUNTER (OUTPATIENT)
Dept: OCCUPATIONAL THERAPY | Age: 50
Setting detail: THERAPIES SERIES
Discharge: HOME | End: 2025-04-08
Payer: COMMERCIAL

## 2025-04-08 PROCEDURE — 97110 THERAPEUTIC EXERCISES: CPT

## 2025-04-08 PROCEDURE — 97018 PARAFFIN BATH THERAPY: CPT

## 2025-04-08 PROCEDURE — 97140 MANUAL THERAPY 1/> REGIONS: CPT

## 2025-04-08 NOTE — PROGRESS NOTES
OCCUPATIONAL THERAPY DAILY   [facility]  [facility]  [address] [facility] [address]  Dept: [phone]  Loc: [phone]   [facility] OT Fax: [phone]    Date:  2025   Initial Evaluation Date: 2025                            Evaluating Therapist: Samantha Montes OT     Patient Name:  Papito Alexandra                     :  1975     Restrictions/Precautions:  Activities as tolerated, low fall risk  Diagnosis:  S56.122A (ICD-10-CM) - Laceration of flexor muscle, fascia and tendon of left index finger at forearm level, initial encounter   Date of Surgery/Injury: 2025     Insurance/Certification information:  [employer] CMS Claim#: [id number]  Plan of care signed (Y/N): Y - signed electronically  Visit# / total visits: 10 / 12 V authorized until 3/31/25 --> Extended to      Referring Practitioner:  Dr. Aidan Abdi, DO  Specific Practitioner Orders: Eval and treat     Assessment of current deficits   [x] IADLs         [x] Strength     [x] Pain            [x] Sensation         [x] Scar Adhesion/Skin Integrity   [x] Motor Endurance      OT PLAN OF CARE   OT POC based on physician orders, patient diagnosis and results of clinical assessment.     Frequency/Duration: 2-3x/wk for 12 visits  /  Certification Period From: 2025  To: 2025     Specific OT Treatment to include:   [x] Instruction in HEP                   Modalities:  [x] Therapeutic Exercise                 [x] Ultrasound             [x] PROM/Stretching                    [x] Fluidotherapy          [x]  Paraffin                   [x] AAROM  [x] AROM                             [x] ADL/IADL re-training                 [x] Desensitization/Nerve Stimulation     [x] Therapeutic Activity                  [x] Pain Management with/without modalities PRN  [x] Manual Therapy                      [x] Scar Management                                                    [x] Manual Edema Mobilization

## 2025-04-10 ENCOUNTER — HOSPITAL ENCOUNTER (OUTPATIENT)
Dept: OCCUPATIONAL THERAPY | Age: 50
Setting detail: THERAPIES SERIES
Discharge: HOME | End: 2025-04-10
Payer: COMMERCIAL

## 2025-04-10 PROCEDURE — 97140 MANUAL THERAPY 1/> REGIONS: CPT

## 2025-04-10 PROCEDURE — 97018 PARAFFIN BATH THERAPY: CPT

## 2025-04-10 PROCEDURE — 97110 THERAPEUTIC EXERCISES: CPT

## 2025-04-10 NOTE — PROGRESS NOTES
OCCUPATIONAL THERAPY DAILY   Y [facility]  [facility]  [address] [facility] [address]  Dept: [phone]  Loc: [phone]   [facility] OT Fax: [phone]    Date:  4/10/2025   Initial Evaluation Date: 2025                            Evaluating Therapist: Samantha Montes OT     Patient Name:  Papito Alexandra                     :  1975     Restrictions/Precautions:  Activities as tolerated, low fall risk  Diagnosis:  S56.122A (ICD-10-CM) - Laceration of flexor muscle, fascia and tendon of left index finger at forearm level, initial encounter   Date of Surgery/Injury: 2025     Insurance/Certification information:  [employer] CMS Claim#: [id number]  Plan of care signed (Y/N): Y - signed electronically  Visit# / total visits:  V authorized until 3/31/25 --> Extended to      Referring Practitioner:  Dr. Aidan Abdi, DO  Specific Practitioner Orders: Eval and treat     Assessment of current deficits   [x] IADLs         [x] Strength     [x] Pain            [x] Sensation         [x] Scar Adhesion/Skin Integrity   [x] Motor Endurance      OT PLAN OF CARE   OT POC based on physician orders, patient diagnosis and results of clinical assessment.     Frequency/Duration: 2-3x/wk for 12 visits  /  Certification Period From: 2025  To: 2025     Specific OT Treatment to include:   [x] Instruction in HEP                   Modalities:  [x] Therapeutic Exercise                 [x] Ultrasound             [x] PROM/Stretching                    [x] Fluidotherapy          [x]  Paraffin                   [x] AAROM  [x] AROM                             [x] ADL/IADL re-training                 [x] Desensitization/Nerve Stimulation     [x] Therapeutic Activity                  [x] Pain Management with/without modalities PRN  [x] Manual Therapy                      [x] Scar Management                                                    [x] Manual Edema Mobilization

## 2025-04-15 ENCOUNTER — HOSPITAL ENCOUNTER (OUTPATIENT)
Dept: OCCUPATIONAL THERAPY | Age: 50
Setting detail: THERAPIES SERIES
Discharge: HOME | End: 2025-04-15
Payer: COMMERCIAL

## 2025-04-15 PROCEDURE — 97140 MANUAL THERAPY 1/> REGIONS: CPT

## 2025-04-15 PROCEDURE — 97530 THERAPEUTIC ACTIVITIES: CPT

## 2025-04-15 PROCEDURE — 97018 PARAFFIN BATH THERAPY: CPT

## 2025-04-17 ENCOUNTER — APPOINTMENT (OUTPATIENT)
Dept: OCCUPATIONAL THERAPY | Age: 50
End: 2025-04-17
Payer: COMMERCIAL

## (undated) DEVICE — KIT,ANTI FOG,W/SPONGE & FLUID,SOFT PACK: Brand: MEDLINE

## (undated) DEVICE — NEEDLE HYPO 25GA L1.5IN BLU POLYPR HUB S STL REG BVL STR

## (undated) DEVICE — COVER,MAYO STAND,STERILE: Brand: MEDLINE

## (undated) DEVICE — FORCE BIPOLAR: Brand: ENDOWRIST

## (undated) DEVICE — SPONGE GZ W4XL4IN RAYON POLY CVR W/NONWOVEN FAB STRL 2/PK

## (undated) DEVICE — SUTURE V-LOC 180 SZ 2-0 L9IN ABSRB GRN L27MM GS-22 1/2 CIR VLOCL2145

## (undated) DEVICE — WARMER SCP 2 ANTIFOG LAP DISP

## (undated) DEVICE — GLOVE SURG SZ 7 L12IN FNGR THK94MIL TRNSLUC YEL LTX HYDRGEL

## (undated) DEVICE — ELECTRODE PT RET AD L9FT HI MOIST COND ADH HYDRGEL CORDED

## (undated) DEVICE — BLADELESS OBTURATOR: Brand: WECK VISTA

## (undated) DEVICE — MONOPOLAR CURVED SCISSORS: Brand: ENDOWRIST

## (undated) DEVICE — INSUFFLATION NEEDLE TO ESTABLISH PNEUMOPERITONEUM.: Brand: INSUFFLATION NEEDLE

## (undated) DEVICE — TRAP POLYP ETRAP

## (undated) DEVICE — TOWEL,OR,DSP,ST,BLUE,STD,6/PK,12PK/CS: Brand: MEDLINE

## (undated) DEVICE — BLADE,STAINLESS-STEEL,11,STRL,DISPOSABLE: Brand: MEDLINE

## (undated) DEVICE — COLUMN DRAPE

## (undated) DEVICE — CANNULA SEAL

## (undated) DEVICE — LIQUIBAND RAPID ADHESIVE 36/CS 0.8ML: Brand: MEDLINE

## (undated) DEVICE — INSUFFLATION TUBING SET WITH FILTER, FUNNEL CONNECTOR AND LUER LOCK: Brand: JOSNOE MEDICAL INC

## (undated) DEVICE — ENDOSCOPIC KIT CLN SWAB MICROFIBER CLTH SCP CLEANOR DISP

## (undated) DEVICE — APPLICATOR MEDICATED 26 CC SOLUTION HI LT ORNG CHLORAPREP

## (undated) DEVICE — ARM DRAPE

## (undated) DEVICE — DOUBLE BASIN SET: Brand: MEDLINE INDUSTRIES, INC.

## (undated) DEVICE — 1LYRTR 16FR10ML 100%SILI SNAP: Brand: MEDLINE INDUSTRIES, INC.

## (undated) DEVICE — DRAPE,LAP,CHOLE,W/TROUGHS,STERILE: Brand: MEDLINE

## (undated) DEVICE — SOLUTION IRRIG 1000ML 0.9% SOD CHL USP POUR PLAS BTL

## (undated) DEVICE — GOWN,SIRUS,FABRNF,XL,20/CS: Brand: MEDLINE

## (undated) DEVICE — GRADUATE TRIANG MEASURE 1000ML BLK PRNT

## (undated) DEVICE — MEGA SUTURECUT ND: Brand: ENDOWRIST

## (undated) DEVICE — PACK PROCEDURE SURG GEN CUST

## (undated) DEVICE — SYRINGE 20ML LL S/C 50

## (undated) DEVICE — TIP COVER ACCESSORY

## (undated) DEVICE — SNARE ENDOSCP L240CM LOOP W11MM SHTH DIA1.9MM XSM OVL FLX